# Patient Record
Sex: FEMALE | Race: WHITE | NOT HISPANIC OR LATINO | Employment: OTHER | URBAN - METROPOLITAN AREA
[De-identification: names, ages, dates, MRNs, and addresses within clinical notes are randomized per-mention and may not be internally consistent; named-entity substitution may affect disease eponyms.]

---

## 2018-06-14 ENCOUNTER — HISTORICAL (OUTPATIENT)
Dept: ADMINISTRATIVE | Facility: HOSPITAL | Age: 63
End: 2018-06-14

## 2018-06-14 LAB
ABS NEUT (OLG): 3.16 X10(3)/MCL (ref 2.1–9.2)
ALBUMIN SERPL-MCNC: 4 GM/DL (ref 3.4–5)
ALBUMIN/GLOB SERPL: 1.1 {RATIO}
ALP SERPL-CCNC: 69 UNIT/L (ref 38–126)
ALT SERPL-CCNC: 28 UNIT/L (ref 12–78)
AST SERPL-CCNC: 18 UNIT/L (ref 15–37)
BASOPHILS # BLD AUTO: 0 X10(3)/MCL (ref 0–0.2)
BASOPHILS NFR BLD AUTO: 0 %
BILIRUB SERPL-MCNC: 0.5 MG/DL (ref 0.2–1)
BILIRUBIN DIRECT+TOT PNL SERPL-MCNC: 0.1 MG/DL (ref 0–0.2)
BILIRUBIN DIRECT+TOT PNL SERPL-MCNC: 0.4 MG/DL (ref 0–0.8)
BUN SERPL-MCNC: 15 MG/DL (ref 7–18)
CALCIUM SERPL-MCNC: 9.2 MG/DL (ref 8.5–10.1)
CHLORIDE SERPL-SCNC: 105 MMOL/L (ref 98–107)
CHOLEST SERPL-MCNC: 162 MG/DL (ref 0–200)
CHOLEST/HDLC SERPL: 4.9 {RATIO} (ref 0–4)
CO2 SERPL-SCNC: 26 MMOL/L (ref 21–32)
CREAT SERPL-MCNC: 0.85 MG/DL (ref 0.55–1.02)
EOSINOPHIL # BLD AUTO: 0.1 X10(3)/MCL (ref 0–0.9)
EOSINOPHIL NFR BLD AUTO: 2 %
ERYTHROCYTE [DISTWIDTH] IN BLOOD BY AUTOMATED COUNT: 12.9 % (ref 11.5–17)
GLOBULIN SER-MCNC: 3.7 GM/DL (ref 2.4–3.5)
GLUCOSE SERPL-MCNC: 102 MG/DL (ref 74–106)
HCT VFR BLD AUTO: 44.1 % (ref 37–47)
HDLC SERPL-MCNC: 33 MG/DL (ref 35–60)
HGB BLD-MCNC: 14.7 GM/DL (ref 12–16)
LDLC SERPL CALC-MCNC: 89 MG/DL (ref 0–129)
LYMPHOCYTES # BLD AUTO: 2.6 X10(3)/MCL (ref 0.6–4.6)
LYMPHOCYTES NFR BLD AUTO: 40 %
MCH RBC QN AUTO: 31.5 PG (ref 27–31)
MCHC RBC AUTO-ENTMCNC: 33.3 GM/DL (ref 33–36)
MCV RBC AUTO: 94.4 FL (ref 80–94)
MONOCYTES # BLD AUTO: 0.6 X10(3)/MCL (ref 0.1–1.3)
MONOCYTES NFR BLD AUTO: 9 %
NEUTROPHILS # BLD AUTO: 3.16 X10(3)/MCL (ref 2.1–9.2)
NEUTROPHILS NFR BLD AUTO: 49 %
PLATELET # BLD AUTO: 248 X10(3)/MCL (ref 130–400)
PMV BLD AUTO: 9.2 FL (ref 9.4–12.4)
POTASSIUM SERPL-SCNC: 4.4 MMOL/L (ref 3.5–5.1)
PROT SERPL-MCNC: 7.7 GM/DL (ref 6.4–8.2)
RBC # BLD AUTO: 4.67 X10(6)/MCL (ref 4.2–5.4)
SODIUM SERPL-SCNC: 141 MMOL/L (ref 136–145)
TRIGL SERPL-MCNC: 200 MG/DL (ref 30–150)
VLDLC SERPL CALC-MCNC: 40 MG/DL
WBC # SPEC AUTO: 6.4 X10(3)/MCL (ref 4.5–11.5)

## 2019-03-15 ENCOUNTER — HISTORICAL (OUTPATIENT)
Dept: ADMINISTRATIVE | Facility: HOSPITAL | Age: 64
End: 2019-03-15

## 2019-03-15 LAB
BUN SERPL-MCNC: 21 MG/DL (ref 7–18)
CREAT SERPL-MCNC: 1.06 MG/DL (ref 0.55–1.02)

## 2019-06-21 ENCOUNTER — HISTORICAL (OUTPATIENT)
Dept: RADIOLOGY | Facility: HOSPITAL | Age: 64
End: 2019-06-21

## 2019-07-02 ENCOUNTER — HISTORICAL (OUTPATIENT)
Dept: RADIOLOGY | Facility: HOSPITAL | Age: 64
End: 2019-07-02

## 2019-07-15 ENCOUNTER — HISTORICAL (OUTPATIENT)
Dept: ADMINISTRATIVE | Facility: HOSPITAL | Age: 64
End: 2019-07-15

## 2019-07-15 LAB
ABS NEUT (OLG): 3.18 X10(3)/MCL (ref 2.1–9.2)
ALBUMIN SERPL-MCNC: 3.8 GM/DL (ref 3.4–5)
ALBUMIN/GLOB SERPL: 1.2 RATIO (ref 1.1–2)
ALP SERPL-CCNC: 69 UNIT/L (ref 38–126)
ALT SERPL-CCNC: 34 UNIT/L (ref 12–78)
AST SERPL-CCNC: 18 UNIT/L (ref 15–37)
BASOPHILS # BLD AUTO: 0.1 X10(3)/MCL (ref 0–0.2)
BASOPHILS NFR BLD AUTO: 1 %
BILIRUB SERPL-MCNC: 0.4 MG/DL (ref 0.2–1)
BILIRUBIN DIRECT+TOT PNL SERPL-MCNC: 0.1 MG/DL (ref 0–0.5)
BILIRUBIN DIRECT+TOT PNL SERPL-MCNC: 0.3 MG/DL (ref 0–0.8)
BUN SERPL-MCNC: 24 MG/DL (ref 7–18)
CALCIUM SERPL-MCNC: 9 MG/DL (ref 8.5–10.1)
CHLORIDE SERPL-SCNC: 106 MMOL/L (ref 98–107)
CHOLEST SERPL-MCNC: 156 MG/DL (ref 0–200)
CHOLEST/HDLC SERPL: 5.6 {RATIO} (ref 0–4)
CO2 SERPL-SCNC: 28 MMOL/L (ref 21–32)
CREAT SERPL-MCNC: 0.99 MG/DL (ref 0.55–1.02)
EOSINOPHIL # BLD AUTO: 0.4 X10(3)/MCL (ref 0–0.9)
EOSINOPHIL NFR BLD AUTO: 5 %
ERYTHROCYTE [DISTWIDTH] IN BLOOD BY AUTOMATED COUNT: 12.8 % (ref 11.5–17)
GLOBULIN SER-MCNC: 3.3 GM/DL (ref 2.4–3.5)
GLUCOSE SERPL-MCNC: 101 MG/DL (ref 74–106)
HCT VFR BLD AUTO: 44.8 % (ref 37–47)
HDLC SERPL-MCNC: 28 MG/DL (ref 35–60)
HGB BLD-MCNC: 14.5 GM/DL (ref 12–16)
LDLC SERPL CALC-MCNC: 86 MG/DL (ref 0–129)
LYMPHOCYTES # BLD AUTO: 3.1 X10(3)/MCL (ref 0.6–4.6)
LYMPHOCYTES NFR BLD AUTO: 41 %
MCH RBC QN AUTO: 31.3 PG (ref 27–31)
MCHC RBC AUTO-ENTMCNC: 32.4 GM/DL (ref 33–36)
MCV RBC AUTO: 96.8 FL (ref 80–94)
MONOCYTES # BLD AUTO: 0.8 X10(3)/MCL (ref 0.1–1.3)
MONOCYTES NFR BLD AUTO: 11 %
NEUTROPHILS # BLD AUTO: 3.18 X10(3)/MCL (ref 2.1–9.2)
NEUTROPHILS NFR BLD AUTO: 42 %
PLATELET # BLD AUTO: 248 X10(3)/MCL (ref 130–400)
PMV BLD AUTO: 9.8 FL (ref 9.4–12.4)
POTASSIUM SERPL-SCNC: 4.4 MMOL/L (ref 3.5–5.1)
PROT SERPL-MCNC: 7.1 GM/DL (ref 6.4–8.2)
RBC # BLD AUTO: 4.63 X10(6)/MCL (ref 4.2–5.4)
SODIUM SERPL-SCNC: 140 MMOL/L (ref 136–145)
TRIGL SERPL-MCNC: 209 MG/DL (ref 30–150)
VLDLC SERPL CALC-MCNC: 42 MG/DL
WBC # SPEC AUTO: 7.6 X10(3)/MCL (ref 4.5–11.5)

## 2020-02-11 ENCOUNTER — HISTORICAL (OUTPATIENT)
Dept: ADMINISTRATIVE | Facility: HOSPITAL | Age: 65
End: 2020-02-11

## 2020-02-11 LAB
ABS NEUT (OLG): 4.25 X10(3)/MCL (ref 2.1–9.2)
ALBUMIN SERPL-MCNC: 3.6 GM/DL (ref 3.4–5)
ALBUMIN/GLOB SERPL: 1.1 RATIO (ref 1.1–2)
ALP SERPL-CCNC: 68 UNIT/L (ref 38–126)
ALT SERPL-CCNC: 51 UNIT/L (ref 12–78)
AST SERPL-CCNC: 22 UNIT/L (ref 15–37)
BASOPHILS # BLD AUTO: 0.1 X10(3)/MCL (ref 0–0.2)
BASOPHILS NFR BLD AUTO: 1 %
BILIRUB SERPL-MCNC: 0.3 MG/DL (ref 0.2–1)
BILIRUBIN DIRECT+TOT PNL SERPL-MCNC: 0.1 MG/DL (ref 0–0.5)
BILIRUBIN DIRECT+TOT PNL SERPL-MCNC: 0.2 MG/DL (ref 0–0.8)
BUN SERPL-MCNC: 19 MG/DL (ref 7–18)
CALCIUM SERPL-MCNC: 9 MG/DL (ref 8.5–10.1)
CHLORIDE SERPL-SCNC: 109 MMOL/L (ref 98–107)
CHOLEST SERPL-MCNC: 138 MG/DL (ref 0–200)
CHOLEST/HDLC SERPL: 4.6 {RATIO} (ref 0–4)
CO2 SERPL-SCNC: 27 MMOL/L (ref 21–32)
CREAT SERPL-MCNC: 0.86 MG/DL (ref 0.55–1.02)
EOSINOPHIL # BLD AUTO: 0.1 X10(3)/MCL (ref 0–0.9)
EOSINOPHIL NFR BLD AUTO: 1 %
ERYTHROCYTE [DISTWIDTH] IN BLOOD BY AUTOMATED COUNT: 12.8 % (ref 11.5–17)
GLOBULIN SER-MCNC: 3.4 GM/DL (ref 2.4–3.5)
GLUCOSE SERPL-MCNC: 110 MG/DL (ref 74–106)
HCT VFR BLD AUTO: 45.8 % (ref 37–47)
HDLC SERPL-MCNC: 30 MG/DL (ref 35–60)
HGB BLD-MCNC: 15 GM/DL (ref 12–16)
LDLC SERPL CALC-MCNC: 80 MG/DL (ref 0–129)
LYMPHOCYTES # BLD AUTO: 2.5 X10(3)/MCL (ref 0.6–4.6)
LYMPHOCYTES NFR BLD AUTO: 32 %
MCH RBC QN AUTO: 31.6 PG (ref 27–31)
MCHC RBC AUTO-ENTMCNC: 32.8 GM/DL (ref 33–36)
MCV RBC AUTO: 96.6 FL (ref 80–94)
MONOCYTES # BLD AUTO: 0.7 X10(3)/MCL (ref 0.1–1.3)
MONOCYTES NFR BLD AUTO: 10 %
NEUTROPHILS # BLD AUTO: 4.25 X10(3)/MCL (ref 2.1–9.2)
NEUTROPHILS NFR BLD AUTO: 55 %
PLATELET # BLD AUTO: 276 X10(3)/MCL (ref 130–400)
PMV BLD AUTO: 9.5 FL (ref 9.4–12.4)
POTASSIUM SERPL-SCNC: 4.5 MMOL/L (ref 3.5–5.1)
PROT SERPL-MCNC: 7 GM/DL (ref 6.4–8.2)
RBC # BLD AUTO: 4.74 X10(6)/MCL (ref 4.2–5.4)
SODIUM SERPL-SCNC: 141 MMOL/L (ref 136–145)
TRIGL SERPL-MCNC: 140 MG/DL (ref 30–150)
VLDLC SERPL CALC-MCNC: 28 MG/DL
WBC # SPEC AUTO: 7.7 X10(3)/MCL (ref 4.5–11.5)

## 2020-05-29 ENCOUNTER — HISTORICAL (OUTPATIENT)
Dept: ADMINISTRATIVE | Facility: HOSPITAL | Age: 65
End: 2020-05-29

## 2020-05-29 LAB
BUN SERPL-MCNC: 14.4 MG/DL (ref 9.8–20.1)
CREAT SERPL-MCNC: 1.08 MG/DL (ref 0.55–1.02)

## 2020-09-08 ENCOUNTER — HISTORICAL (OUTPATIENT)
Dept: ADMINISTRATIVE | Facility: HOSPITAL | Age: 65
End: 2020-09-08

## 2020-09-08 LAB
BUN SERPL-MCNC: 13.8 MG/DL (ref 9.8–20.1)
CREAT SERPL-MCNC: 0.85 MG/DL (ref 0.55–1.02)

## 2020-12-17 ENCOUNTER — HISTORICAL (OUTPATIENT)
Dept: RADIOLOGY | Facility: HOSPITAL | Age: 65
End: 2020-12-17

## 2021-02-12 ENCOUNTER — HISTORICAL (OUTPATIENT)
Dept: ADMINISTRATIVE | Facility: HOSPITAL | Age: 66
End: 2021-02-12

## 2021-02-12 LAB
ABS NEUT (OLG): 3.38 X10(3)/MCL (ref 2.1–9.2)
ALBUMIN SERPL-MCNC: 3.8 GM/DL (ref 3.4–4.8)
ALBUMIN/GLOB SERPL: 1.3 RATIO (ref 1.1–2)
ALP SERPL-CCNC: 69 UNIT/L (ref 40–150)
ALT SERPL-CCNC: 31 UNIT/L (ref 0–55)
AST SERPL-CCNC: 21 UNIT/L (ref 5–34)
BASOPHILS # BLD AUTO: 0.1 X10(3)/MCL (ref 0–0.2)
BASOPHILS NFR BLD AUTO: 1 %
BILIRUB SERPL-MCNC: 0.5 MG/DL
BILIRUBIN DIRECT+TOT PNL SERPL-MCNC: 0.2 MG/DL (ref 0–0.5)
BILIRUBIN DIRECT+TOT PNL SERPL-MCNC: 0.3 MG/DL (ref 0–0.8)
BUN SERPL-MCNC: 15.6 MG/DL (ref 9.8–20.1)
CALCIUM SERPL-MCNC: 9 MG/DL (ref 8.4–10.2)
CHLORIDE SERPL-SCNC: 111 MMOL/L (ref 98–107)
CHOLEST SERPL-MCNC: 147 MG/DL
CHOLEST/HDLC SERPL: 5 {RATIO} (ref 0–5)
CO2 SERPL-SCNC: 27 MMOL/L (ref 23–31)
CREAT SERPL-MCNC: 0.84 MG/DL (ref 0.55–1.02)
EOSINOPHIL # BLD AUTO: 0.3 X10(3)/MCL (ref 0–0.9)
EOSINOPHIL NFR BLD AUTO: 5 %
ERYTHROCYTE [DISTWIDTH] IN BLOOD BY AUTOMATED COUNT: 13.1 % (ref 11.5–17)
GLOBULIN SER-MCNC: 2.9 GM/DL (ref 2.4–3.5)
GLUCOSE SERPL-MCNC: 99 MG/DL (ref 82–115)
HCT VFR BLD AUTO: 43.8 % (ref 37–47)
HDLC SERPL-MCNC: 28 MG/DL (ref 35–60)
HGB BLD-MCNC: 14.2 GM/DL (ref 12–16)
LDLC SERPL CALC-MCNC: 97 MG/DL (ref 50–140)
LYMPHOCYTES # BLD AUTO: 2.5 X10(3)/MCL (ref 0.6–4.6)
LYMPHOCYTES NFR BLD AUTO: 36 %
MCH RBC QN AUTO: 31.7 PG (ref 27–31)
MCHC RBC AUTO-ENTMCNC: 32.4 GM/DL (ref 33–36)
MCV RBC AUTO: 97.8 FL (ref 80–94)
MONOCYTES # BLD AUTO: 0.6 X10(3)/MCL (ref 0.1–1.3)
MONOCYTES NFR BLD AUTO: 8 %
NEUTROPHILS # BLD AUTO: 3.38 X10(3)/MCL (ref 2.1–9.2)
NEUTROPHILS NFR BLD AUTO: 49 %
PLATELET # BLD AUTO: 240 X10(3)/MCL (ref 130–400)
PMV BLD AUTO: 9.8 FL (ref 9.4–12.4)
POTASSIUM SERPL-SCNC: 4.6 MMOL/L (ref 3.5–5.1)
PROT SERPL-MCNC: 6.7 GM/DL (ref 5.8–7.6)
RBC # BLD AUTO: 4.48 X10(6)/MCL (ref 4.2–5.4)
SODIUM SERPL-SCNC: 143 MMOL/L (ref 136–145)
TRIGL SERPL-MCNC: 109 MG/DL (ref 37–140)
VLDLC SERPL CALC-MCNC: 22 MG/DL
WBC # SPEC AUTO: 6.9 X10(3)/MCL (ref 4.5–11.5)

## 2021-04-14 ENCOUNTER — HISTORICAL (OUTPATIENT)
Dept: RADIOLOGY | Facility: HOSPITAL | Age: 66
End: 2021-04-14

## 2021-04-14 LAB — POC CREATININE: 1 MG/DL (ref 0.6–1.3)

## 2021-06-25 ENCOUNTER — HISTORICAL (OUTPATIENT)
Dept: ADMINISTRATIVE | Facility: HOSPITAL | Age: 66
End: 2021-06-25

## 2021-06-25 LAB
BUN SERPL-MCNC: 16.1 MG/DL (ref 9.8–20.1)
CREAT SERPL-MCNC: 0.81 MG/DL (ref 0.55–1.02)

## 2021-07-27 LAB
ABS NEUT (OLG): 2.78 X10(3)/MCL (ref 2.1–9.2)
BASOPHILS # BLD AUTO: 0.1 X10(3)/MCL (ref 0–0.2)
BASOPHILS NFR BLD AUTO: 1 %
BUN SERPL-MCNC: 15.2 MG/DL (ref 9.8–20.1)
CALCIUM SERPL-MCNC: 9.4 MG/DL (ref 8.4–10.2)
CHLORIDE SERPL-SCNC: 105 MMOL/L (ref 98–107)
CO2 SERPL-SCNC: 27 MMOL/L (ref 23–31)
CREAT SERPL-MCNC: 0.89 MG/DL (ref 0.55–1.02)
CREAT/UREA NIT SERPL: 17
EOSINOPHIL # BLD AUTO: 0.1 X10(3)/MCL (ref 0–0.9)
EOSINOPHIL NFR BLD AUTO: 2 %
ERYTHROCYTE [DISTWIDTH] IN BLOOD BY AUTOMATED COUNT: 13.1 % (ref 11.5–17)
GLUCOSE SERPL-MCNC: 94 MG/DL (ref 82–115)
HCT VFR BLD AUTO: 42.8 % (ref 37–47)
HGB BLD-MCNC: 13.9 GM/DL (ref 12–16)
LYMPHOCYTES # BLD AUTO: 2.4 X10(3)/MCL (ref 0.6–4.6)
LYMPHOCYTES NFR BLD AUTO: 39 %
MCH RBC QN AUTO: 31.2 PG (ref 27–31)
MCHC RBC AUTO-ENTMCNC: 32.5 GM/DL (ref 33–36)
MCV RBC AUTO: 96.2 FL (ref 80–94)
MONOCYTES # BLD AUTO: 0.7 X10(3)/MCL (ref 0.1–1.3)
MONOCYTES NFR BLD AUTO: 11 %
NEUTROPHILS # BLD AUTO: 2.78 X10(3)/MCL (ref 2.1–9.2)
NEUTROPHILS NFR BLD AUTO: 46 %
PLATELET # BLD AUTO: 252 X10(3)/MCL (ref 130–400)
PMV BLD AUTO: 9.9 FL (ref 9.4–12.4)
POTASSIUM SERPL-SCNC: 5.1 MMOL/L (ref 3.5–5.1)
RBC # BLD AUTO: 4.45 X10(6)/MCL (ref 4.2–5.4)
SODIUM SERPL-SCNC: 140 MMOL/L (ref 136–145)
WBC # SPEC AUTO: 6 X10(3)/MCL (ref 4.5–11.5)

## 2021-09-15 ENCOUNTER — HISTORICAL (OUTPATIENT)
Dept: ADMINISTRATIVE | Facility: HOSPITAL | Age: 66
End: 2021-09-15

## 2021-09-15 LAB
ABS NEUT (OLG): 2.96 X10(3)/MCL (ref 2.1–9.2)
BASOPHILS # BLD AUTO: 0 X10(3)/MCL (ref 0–0.2)
BASOPHILS NFR BLD AUTO: 1 %
BUN SERPL-MCNC: 14.9 MG/DL (ref 9.8–20.1)
CALCIUM SERPL-MCNC: 9.7 MG/DL (ref 8.4–10.2)
CHLORIDE SERPL-SCNC: 105 MMOL/L (ref 98–107)
CO2 SERPL-SCNC: 27 MMOL/L (ref 23–31)
CREAT SERPL-MCNC: 0.84 MG/DL (ref 0.55–1.02)
CREAT/UREA NIT SERPL: 18
EOSINOPHIL # BLD AUTO: 0.1 X10(3)/MCL (ref 0–0.9)
EOSINOPHIL NFR BLD AUTO: 2 %
ERYTHROCYTE [DISTWIDTH] IN BLOOD BY AUTOMATED COUNT: 13 % (ref 11.5–17)
GLUCOSE SERPL-MCNC: 94 MG/DL (ref 82–115)
HCT VFR BLD AUTO: 45.8 % (ref 37–47)
HGB BLD-MCNC: 14.9 GM/DL (ref 12–16)
LYMPHOCYTES # BLD AUTO: 2.6 X10(3)/MCL (ref 0.6–4.6)
LYMPHOCYTES NFR BLD AUTO: 40 %
MCH RBC QN AUTO: 31 PG (ref 27–31)
MCHC RBC AUTO-ENTMCNC: 32.5 GM/DL (ref 33–36)
MCV RBC AUTO: 95.2 FL (ref 80–94)
MONOCYTES # BLD AUTO: 0.6 X10(3)/MCL (ref 0.1–1.3)
MONOCYTES NFR BLD AUTO: 10 %
NEUTROPHILS # BLD AUTO: 2.96 X10(3)/MCL (ref 2.1–9.2)
NEUTROPHILS NFR BLD AUTO: 47 %
PLATELET # BLD AUTO: 265 X10(3)/MCL (ref 130–400)
PMV BLD AUTO: 9.8 FL (ref 9.4–12.4)
POTASSIUM SERPL-SCNC: 4.4 MMOL/L (ref 3.5–5.1)
RBC # BLD AUTO: 4.81 X10(6)/MCL (ref 4.2–5.4)
SARS-COV-2 RNA RESP QL NAA+PROBE: NOT DETECTED
SODIUM SERPL-SCNC: 139 MMOL/L (ref 136–145)
WBC # SPEC AUTO: 6.4 X10(3)/MCL (ref 4.5–11.5)

## 2021-09-17 ENCOUNTER — HOSPITAL ENCOUNTER (OUTPATIENT)
Dept: INTENSIVE CARE | Facility: HOSPITAL | Age: 66
End: 2021-09-19

## 2021-09-17 LAB — GROUP & RH: NORMAL

## 2021-10-25 ENCOUNTER — HISTORICAL (OUTPATIENT)
Dept: ADMINISTRATIVE | Facility: HOSPITAL | Age: 66
End: 2021-10-25

## 2022-01-06 ENCOUNTER — HISTORICAL (OUTPATIENT)
Dept: ADMINISTRATIVE | Facility: HOSPITAL | Age: 67
End: 2022-01-06

## 2022-05-04 NOTE — HISTORICAL [facility] CERNER
This is a historical note converted from Harsha. Formatting and pictures may have been removed.  Please reference Harsha for original formatting and attached multimedia. Indication for Surgery  The patient is a?66-year-old female?who presented for evaluation of both neck pain with radicular symptoms as well as?low back problems.? Guarding her low back she required?bilateral L3/L4 and L4/L5 decompressions?and now wishes to have her neck addressed.? Pain is currently?in the neck?radiates down the arms?she rates it as?7 out of 10.? She also reproduces her neck pain?with range of motion.? Pain in her arms runs down to her hands and to her thumbs.? Imaging study showed significant cervical spondylosis?C3/4 through C6/7 and the recommendation at this time?is an anterior cervical discectomy and fusion C3-C7?patient understands the risk and benefits and consents  Preoperative Diagnosis  Cervical spondylosis with radiculopathy  Postoperative Diagnosis  Same  Operation  Anterior cervical discectomy C3/4, C4/5, C5/6, C6/7  Anterior arthrodesis with?allograft?C3/4, C4/5, C5/6, C6/7  Anterior cervical instrumentation with Orthofix plate C3-C7  Use of the microscope  Surgeon(s)  Emil Temple MD  Assistant  None  Anesthesia  General  Estimated Blood Loss  100 mL  Findings  Cervical spondylosis  Specimen(s)  None  Complications  None  Technique  The patient was taken to the operating room and placed on the table in supine position. ?After instillation of general anesthetic,?patient was positioned and prepped and draped usual fashion.? Transverse incision was made over C5. ?Hemostasis was assured with electrocautery.? Dissection was carried down to the platysmas and the medial border of the sternocleidomastoid identified.? Then utilizing sharp and blunt dissection, the dissection was carried down to the prevertebral space.? Longus colli muscle was divided divided midline. ?Self-retaining was positioned.? Starting at the upper  2 levels distraction screws were placed at C3?and C5. ?Air was slight distracted?utilizing air drill anterior osteophytes had to be taken out first?and then discectomy was performed with the [place] drill. ?Microscope was brought in and posterior osteophytes were taken off with a 2 mm Kerrison?vertebral bodies were undermined and the spinal cord was decompressed extensive bilateral foraminotomies were performed.? Wound was irrigated after assuring hemostasis Gelfoam soaked in thrombin?allograft were selected and impacted at?C3/4 and at C4/5.? Distraction was released and distraction screw at C3 was moved to C7 and the hole was bone wax. ?Again the area was slight distracted anterior osteophytes had to be drilled down?with a Vick drill?and then discectomy was performed with the dura. ?Microscope was brought back in and a 2 mm Kerrison was used to remove posterior osteophytes?decompress the cord and extensive bilateral foraminotomies were performed.? Wound was irrigated and again after assuring hemostasis with Gelfoam soaked in thrombin allograft were selected and impacted at C5/6 and at C6/7.? Distraction was removed distraction screws?were removed and the holes?were bone wax.? A Orthofix plate spanning from C3-C7 was position then under fluoroscopic control and utilizing the drill guide drill holes were placed.? Screws were inserted. ?Locking mechanisms were engaged.? Wound was irrigated?and assuring hemostasis?a 10 Nauruan Abimael-Neal drain was inserted self retainers were removed and the platysmas was reapproximated with 2-0 Vicryl running fashion?skin was closed with 4-0 Vicryl subcuticular stitch. ?Dressed with Steri-Strips and island dressing.? Patient tolerated procedure well and was transferred to recovery in stable condition. ?Of note bone quality was good and she does not require a hard collar.

## 2023-06-08 ENCOUNTER — OUTSIDE PLACE OF SERVICE (OUTPATIENT)
Dept: SURGERY | Facility: CLINIC | Age: 68
End: 2023-06-08

## 2023-06-21 DIAGNOSIS — M48.062 NEUROGENIC CLAUDICATION DUE TO LUMBAR SPINAL STENOSIS: Primary | ICD-10-CM

## 2023-06-26 ENCOUNTER — HOSPITAL ENCOUNTER (OUTPATIENT)
Dept: RADIOLOGY | Facility: HOSPITAL | Age: 68
Discharge: HOME | End: 2023-06-26
Attending: NEUROLOGICAL SURGERY
Payer: MEDICARE

## 2023-06-26 DIAGNOSIS — M48.062 PSEUDOCLAUDICATION SYNDROME: Primary | ICD-10-CM

## 2023-06-26 DIAGNOSIS — M48.062 PSEUDOCLAUDICATION SYNDROME: ICD-10-CM

## 2023-06-26 PROCEDURE — 72114 X-RAY EXAM L-S SPINE BENDING: CPT | Mod: TC

## 2023-07-19 RX ORDER — HYDROCHLOROTHIAZIDE 12.5 MG/1
12.5 TABLET ORAL DAILY
COMMUNITY
Start: 2023-05-08

## 2023-07-19 RX ORDER — LISINOPRIL 20 MG/1
20 TABLET ORAL DAILY
COMMUNITY
Start: 2023-05-08

## 2023-07-19 RX ORDER — ATORVASTATIN CALCIUM 20 MG/1
TABLET, FILM COATED ORAL
COMMUNITY
Start: 2023-06-19

## 2023-07-19 RX ORDER — ESCITALOPRAM OXALATE 20 MG/1
20 TABLET ORAL DAILY
COMMUNITY

## 2023-07-19 RX ORDER — QUETIAPINE FUMARATE 200 MG/1
200 TABLET, FILM COATED ORAL NIGHTLY
COMMUNITY
Start: 2023-05-08

## 2023-07-19 RX ORDER — CHOLECALCIFEROL (VITAMIN D3) 50 MCG
1 TABLET ORAL DAILY
COMMUNITY

## 2023-07-19 RX ORDER — METOPROLOL TARTRATE 25 MG/1
25 TABLET, FILM COATED ORAL 2 TIMES DAILY
COMMUNITY
Start: 2023-05-08

## 2023-07-24 DIAGNOSIS — M48.062 PSEUDOCLAUDICATION SYNDROME: Primary | ICD-10-CM

## 2023-07-26 ENCOUNTER — HOSPITAL ENCOUNTER (OUTPATIENT)
Dept: RADIOLOGY | Facility: HOSPITAL | Age: 68
Discharge: HOME | End: 2023-07-26
Attending: NEUROLOGICAL SURGERY
Payer: MEDICARE

## 2023-07-26 PROCEDURE — 71046 X-RAY EXAM CHEST 2 VIEWS: CPT | Mod: TC

## 2023-07-27 ENCOUNTER — ANESTHESIA EVENT (OUTPATIENT)
Dept: SURGERY | Facility: HOSPITAL | Age: 68
DRG: 460 | End: 2023-07-27
Payer: MEDICARE

## 2023-07-27 DIAGNOSIS — I70.0 ATHEROSCLEROSIS OF AORTA: Primary | ICD-10-CM

## 2023-07-28 ENCOUNTER — HOSPITAL ENCOUNTER (OUTPATIENT)
Dept: RADIOLOGY | Facility: HOSPITAL | Age: 68
Discharge: HOME | End: 2023-07-28
Attending: NEUROLOGICAL SURGERY
Payer: MEDICARE

## 2023-07-28 DIAGNOSIS — M48.062 PSEUDOCLAUDICATION SYNDROME: ICD-10-CM

## 2023-07-28 PROCEDURE — 72131 CT LUMBAR SPINE W/O DYE: CPT | Mod: TC

## 2023-08-01 ENCOUNTER — PATIENT MESSAGE (OUTPATIENT)
Dept: ADMINISTRATIVE | Facility: OTHER | Age: 68
End: 2023-08-01
Payer: MEDICARE

## 2023-08-02 ENCOUNTER — ANESTHESIA (OUTPATIENT)
Dept: SURGERY | Facility: HOSPITAL | Age: 68
DRG: 460 | End: 2023-08-02
Payer: MEDICARE

## 2023-08-02 ENCOUNTER — HOSPITAL ENCOUNTER (INPATIENT)
Facility: HOSPITAL | Age: 68
LOS: 3 days | Discharge: HOME | DRG: 460 | End: 2023-08-05
Attending: NEUROLOGICAL SURGERY | Admitting: NEUROLOGICAL SURGERY
Payer: MEDICARE

## 2023-08-02 DIAGNOSIS — M48.062 SPINAL STENOSIS, LUMBAR REGION, WITH NEUROGENIC CLAUDICATION: ICD-10-CM

## 2023-08-02 LAB
BASOPHILS # BLD AUTO: 0.07 X10(3)/MCL
BASOPHILS NFR BLD AUTO: 1.2 %
EOSINOPHIL # BLD AUTO: 0.14 X10(3)/MCL (ref 0–0.9)
EOSINOPHIL NFR BLD AUTO: 2.3 %
ERYTHROCYTE [DISTWIDTH] IN BLOOD BY AUTOMATED COUNT: 12.6 % (ref 11.5–17)
GROUP & RH: NORMAL
HCT VFR BLD AUTO: 41.2 % (ref 37–47)
HGB BLD-MCNC: 14.1 G/DL (ref 12–16)
IMM GRANULOCYTES # BLD AUTO: 0.02 X10(3)/MCL (ref 0–0.04)
IMM GRANULOCYTES NFR BLD AUTO: 0.3 %
INDIRECT COOMBS GEL: NORMAL
LYMPHOCYTES # BLD AUTO: 2.41 X10(3)/MCL (ref 0.6–4.6)
LYMPHOCYTES NFR BLD AUTO: 40 %
MCH RBC QN AUTO: 32.3 PG (ref 27–31)
MCHC RBC AUTO-ENTMCNC: 34.2 G/DL (ref 33–36)
MCV RBC AUTO: 94.3 FL (ref 80–94)
MONOCYTES # BLD AUTO: 0.64 X10(3)/MCL (ref 0.1–1.3)
MONOCYTES NFR BLD AUTO: 10.6 %
NEUTROPHILS # BLD AUTO: 2.74 X10(3)/MCL (ref 2.1–9.2)
NEUTROPHILS NFR BLD AUTO: 45.6 %
NRBC BLD AUTO-RTO: 0 %
PLATELET # BLD AUTO: 221 X10(3)/MCL (ref 130–400)
PMV BLD AUTO: 9.5 FL (ref 7.4–10.4)
RBC # BLD AUTO: 4.37 X10(6)/MCL (ref 4.2–5.4)
SPECIMEN OUTDATE: NORMAL
WBC # SPEC AUTO: 6.02 X10(3)/MCL (ref 4.5–11.5)

## 2023-08-02 PROCEDURE — 71000033 HC RECOVERY, INTIAL HOUR: Performed by: NEUROLOGICAL SURGERY

## 2023-08-02 PROCEDURE — 36620 INSERTION CATHETER ARTERY: CPT | Mod: 59,,, | Performed by: ANESTHESIOLOGY

## 2023-08-02 PROCEDURE — 63600175 PHARM REV CODE 636 W HCPCS: Performed by: NURSE ANESTHETIST, CERTIFIED REGISTERED

## 2023-08-02 PROCEDURE — D9220A PRA ANESTHESIA: Mod: CRNA,,, | Performed by: NURSE ANESTHETIST, CERTIFIED REGISTERED

## 2023-08-02 PROCEDURE — D9220A PRA ANESTHESIA: ICD-10-PCS | Mod: ANES,,, | Performed by: ANESTHESIOLOGY

## 2023-08-02 PROCEDURE — 25000003 PHARM REV CODE 250: Performed by: NURSE ANESTHETIST, CERTIFIED REGISTERED

## 2023-08-02 PROCEDURE — 37000008 HC ANESTHESIA 1ST 15 MINUTES: Performed by: NEUROLOGICAL SURGERY

## 2023-08-02 PROCEDURE — 51702 INSERT TEMP BLADDER CATH: CPT

## 2023-08-02 PROCEDURE — 36620 ARTERIAL: ICD-10-PCS | Mod: 59,,, | Performed by: ANESTHESIOLOGY

## 2023-08-02 PROCEDURE — 25000003 PHARM REV CODE 250: Performed by: ANESTHESIOLOGY

## 2023-08-02 PROCEDURE — D9220A PRA ANESTHESIA: ICD-10-PCS | Mod: CRNA,,, | Performed by: NURSE ANESTHETIST, CERTIFIED REGISTERED

## 2023-08-02 PROCEDURE — 63600175 PHARM REV CODE 636 W HCPCS: Mod: JZ,JG

## 2023-08-02 PROCEDURE — 27201423 OPTIME MED/SURG SUP & DEVICES STERILE SUPPLY: Performed by: NEUROLOGICAL SURGERY

## 2023-08-02 PROCEDURE — 86900 BLOOD TYPING SEROLOGIC ABO: CPT

## 2023-08-02 PROCEDURE — D9220A PRA ANESTHESIA: Mod: ANES,,, | Performed by: ANESTHESIOLOGY

## 2023-08-02 PROCEDURE — 63600175 PHARM REV CODE 636 W HCPCS: Performed by: ANESTHESIOLOGY

## 2023-08-02 PROCEDURE — C1713 ANCHOR/SCREW BN/BN,TIS/BN: HCPCS | Performed by: NEUROLOGICAL SURGERY

## 2023-08-02 PROCEDURE — 25000003 PHARM REV CODE 250: Performed by: NEUROLOGICAL SURGERY

## 2023-08-02 PROCEDURE — 71000015 HC POSTOP RECOV 1ST HR: Performed by: NEUROLOGICAL SURGERY

## 2023-08-02 PROCEDURE — 63600175 PHARM REV CODE 636 W HCPCS

## 2023-08-02 PROCEDURE — P9047 ALBUMIN (HUMAN), 25%, 50ML: HCPCS | Mod: JZ,JG

## 2023-08-02 PROCEDURE — 63600175 PHARM REV CODE 636 W HCPCS: Performed by: NEUROLOGICAL SURGERY

## 2023-08-02 PROCEDURE — 27800903 OPTIME MED/SURG SUP & DEVICES OTHER IMPLANTS: Performed by: NEUROLOGICAL SURGERY

## 2023-08-02 PROCEDURE — 85025 COMPLETE CBC W/AUTO DIFF WBC: CPT

## 2023-08-02 PROCEDURE — 37000009 HC ANESTHESIA EA ADD 15 MINS: Performed by: NEUROLOGICAL SURGERY

## 2023-08-02 PROCEDURE — 25000003 PHARM REV CODE 250

## 2023-08-02 PROCEDURE — 36000712 HC OR TIME LEV V 1ST 15 MIN: Performed by: NEUROLOGICAL SURGERY

## 2023-08-02 PROCEDURE — 36000713 HC OR TIME LEV V EA ADD 15 MIN: Performed by: NEUROLOGICAL SURGERY

## 2023-08-02 PROCEDURE — 71000039 HC RECOVERY, EACH ADD'L HOUR: Performed by: NEUROLOGICAL SURGERY

## 2023-08-02 PROCEDURE — 11000001 HC ACUTE MED/SURG PRIVATE ROOM

## 2023-08-02 DEVICE — SET SCREW: Type: IMPLANTABLE DEVICE | Site: SPINE LUMBAR | Status: FUNCTIONAL

## 2023-08-02 DEVICE — IMPLANTABLE DEVICE: Type: IMPLANTABLE DEVICE | Site: SPINE LUMBAR | Status: FUNCTIONAL

## 2023-08-02 DEVICE — DBM T42210 2.5CMX5CM 2 EACH GRAFTON MATR
Type: IMPLANTABLE DEVICE | Site: SPINE LUMBAR | Status: FUNCTIONAL
Brand: GRAFTON®AND GRAFTON PLUS®DEMINERALIZED BONE MATRIX (DBM)

## 2023-08-02 RX ORDER — ACETAMINOPHEN 325 MG/1
650 TABLET ORAL EVERY 6 HOURS PRN
Status: DISCONTINUED | OUTPATIENT
Start: 2023-08-02 | End: 2023-08-05 | Stop reason: HOSPADM

## 2023-08-02 RX ORDER — HYDROCODONE BITARTRATE AND ACETAMINOPHEN 10; 325 MG/1; MG/1
1 TABLET ORAL EVERY 4 HOURS PRN
Status: DISCONTINUED | OUTPATIENT
Start: 2023-08-02 | End: 2023-08-05 | Stop reason: HOSPADM

## 2023-08-02 RX ORDER — GLYCOPYRROLATE 0.2 MG/ML
INJECTION INTRAMUSCULAR; INTRAVENOUS
Status: DISCONTINUED | OUTPATIENT
Start: 2023-08-02 | End: 2023-08-02

## 2023-08-02 RX ORDER — CYCLOBENZAPRINE HCL 10 MG
10 TABLET ORAL 3 TIMES DAILY PRN
Status: DISCONTINUED | OUTPATIENT
Start: 2023-08-02 | End: 2023-08-05 | Stop reason: HOSPADM

## 2023-08-02 RX ORDER — ONDANSETRON 4 MG/1
4 TABLET, ORALLY DISINTEGRATING ORAL EVERY 6 HOURS PRN
Status: DISCONTINUED | OUTPATIENT
Start: 2023-08-02 | End: 2023-08-05 | Stop reason: HOSPADM

## 2023-08-02 RX ORDER — PROCHLORPERAZINE EDISYLATE 5 MG/ML
10 INJECTION INTRAMUSCULAR; INTRAVENOUS EVERY 6 HOURS PRN
Status: DISCONTINUED | OUTPATIENT
Start: 2023-08-02 | End: 2023-08-05 | Stop reason: HOSPADM

## 2023-08-02 RX ORDER — CALCIUM CARBONATE 200(500)MG
500 TABLET,CHEWABLE ORAL DAILY PRN
Status: DISCONTINUED | OUTPATIENT
Start: 2023-08-02 | End: 2023-08-05 | Stop reason: HOSPADM

## 2023-08-02 RX ORDER — MORPHINE SULFATE 4 MG/ML
2 INJECTION, SOLUTION INTRAMUSCULAR; INTRAVENOUS
Status: DISCONTINUED | OUTPATIENT
Start: 2023-08-02 | End: 2023-08-05 | Stop reason: HOSPADM

## 2023-08-02 RX ORDER — MUPIROCIN 20 MG/G
OINTMENT TOPICAL 2 TIMES DAILY
Status: DISCONTINUED | OUTPATIENT
Start: 2023-08-02 | End: 2023-08-05 | Stop reason: HOSPADM

## 2023-08-02 RX ORDER — LIDOCAINE HYDROCHLORIDE 20 MG/ML
INJECTION, SOLUTION EPIDURAL; INFILTRATION; INTRACAUDAL; PERINEURAL
Status: DISCONTINUED | OUTPATIENT
Start: 2023-08-02 | End: 2023-08-02

## 2023-08-02 RX ORDER — CEFAZOLIN SODIUM 1 G/3ML
INJECTION, POWDER, FOR SOLUTION INTRAMUSCULAR; INTRAVENOUS
Status: DISCONTINUED | OUTPATIENT
Start: 2023-08-02 | End: 2023-08-02 | Stop reason: HOSPADM

## 2023-08-02 RX ORDER — ONDANSETRON 2 MG/ML
INJECTION INTRAMUSCULAR; INTRAVENOUS
Status: DISCONTINUED | OUTPATIENT
Start: 2023-08-02 | End: 2023-08-02

## 2023-08-02 RX ORDER — HYDROMORPHONE HYDROCHLORIDE 2 MG/ML
INJECTION, SOLUTION INTRAMUSCULAR; INTRAVENOUS; SUBCUTANEOUS
Status: DISCONTINUED | OUTPATIENT
Start: 2023-08-02 | End: 2023-08-02

## 2023-08-02 RX ORDER — CEFAZOLIN SODIUM 2 G/50ML
2 SOLUTION INTRAVENOUS
Status: DISPENSED | OUTPATIENT
Start: 2023-08-02 | End: 2023-08-03

## 2023-08-02 RX ORDER — METOPROLOL TARTRATE 25 MG/1
25 TABLET, FILM COATED ORAL 2 TIMES DAILY
Status: DISCONTINUED | OUTPATIENT
Start: 2023-08-02 | End: 2023-08-05 | Stop reason: HOSPADM

## 2023-08-02 RX ORDER — ONDANSETRON 2 MG/ML
4 INJECTION INTRAMUSCULAR; INTRAVENOUS ONCE AS NEEDED
Status: DISCONTINUED | OUTPATIENT
Start: 2023-08-02 | End: 2023-08-02 | Stop reason: HOSPADM

## 2023-08-02 RX ORDER — ACETAMINOPHEN 10 MG/ML
INJECTION, SOLUTION INTRAVENOUS
Status: DISCONTINUED | OUTPATIENT
Start: 2023-08-02 | End: 2023-08-02

## 2023-08-02 RX ORDER — MORPHINE SULFATE 4 MG/ML
1 INJECTION, SOLUTION INTRAMUSCULAR; INTRAVENOUS
Status: DISCONTINUED | OUTPATIENT
Start: 2023-08-02 | End: 2023-08-05 | Stop reason: HOSPADM

## 2023-08-02 RX ORDER — SODIUM CHLORIDE 9 MG/ML
INJECTION, SOLUTION INTRAVENOUS CONTINUOUS
Status: DISCONTINUED | OUTPATIENT
Start: 2023-08-02 | End: 2023-08-05 | Stop reason: HOSPADM

## 2023-08-02 RX ORDER — ROCURONIUM BROMIDE 10 MG/ML
INJECTION, SOLUTION INTRAVENOUS
Status: DISCONTINUED | OUTPATIENT
Start: 2023-08-02 | End: 2023-08-02

## 2023-08-02 RX ORDER — PROPOFOL 10 MG/ML
VIAL (ML) INTRAVENOUS
Status: DISCONTINUED | OUTPATIENT
Start: 2023-08-02 | End: 2023-08-02

## 2023-08-02 RX ORDER — MAG HYDROX/ALUMINUM HYD/SIMETH 200-200-20
30 SUSPENSION, ORAL (FINAL DOSE FORM) ORAL EVERY 4 HOURS PRN
Status: DISCONTINUED | OUTPATIENT
Start: 2023-08-02 | End: 2023-08-05 | Stop reason: HOSPADM

## 2023-08-02 RX ORDER — MIDAZOLAM HYDROCHLORIDE 1 MG/ML
INJECTION INTRAMUSCULAR; INTRAVENOUS
Status: DISPENSED
Start: 2023-08-02 | End: 2023-08-02

## 2023-08-02 RX ORDER — HYDROCODONE BITARTRATE AND ACETAMINOPHEN 5; 325 MG/1; MG/1
1 TABLET ORAL EVERY 4 HOURS PRN
Status: DISCONTINUED | OUTPATIENT
Start: 2023-08-02 | End: 2023-08-05 | Stop reason: HOSPADM

## 2023-08-02 RX ORDER — DIPHENHYDRAMINE HYDROCHLORIDE 50 MG/ML
25 INJECTION INTRAMUSCULAR; INTRAVENOUS ONCE AS NEEDED
Status: DISCONTINUED | OUTPATIENT
Start: 2023-08-02 | End: 2023-08-02 | Stop reason: HOSPADM

## 2023-08-02 RX ORDER — LIDOCAINE HYDROCHLORIDE AND EPINEPHRINE 5; 5 MG/ML; UG/ML
INJECTION, SOLUTION INFILTRATION; PERINEURAL
Status: DISCONTINUED | OUTPATIENT
Start: 2023-08-02 | End: 2023-08-02 | Stop reason: HOSPADM

## 2023-08-02 RX ORDER — CEFAZOLIN SODIUM 2 G/50ML
2 SOLUTION INTRAVENOUS
Status: DISCONTINUED | OUTPATIENT
Start: 2023-08-02 | End: 2023-08-05 | Stop reason: HOSPADM

## 2023-08-02 RX ORDER — LISINOPRIL 20 MG/1
20 TABLET ORAL DAILY
Status: DISCONTINUED | OUTPATIENT
Start: 2023-08-02 | End: 2023-08-04

## 2023-08-02 RX ORDER — ALBUMIN HUMAN 250 G/1000ML
25 SOLUTION INTRAVENOUS ONCE
Status: COMPLETED | OUTPATIENT
Start: 2023-08-02 | End: 2023-08-02

## 2023-08-02 RX ORDER — MEPERIDINE HYDROCHLORIDE 25 MG/ML
12.5 INJECTION INTRAMUSCULAR; INTRAVENOUS; SUBCUTANEOUS EVERY 10 MIN PRN
Status: DISCONTINUED | OUTPATIENT
Start: 2023-08-02 | End: 2023-08-02 | Stop reason: HOSPADM

## 2023-08-02 RX ORDER — PHENYLEPHRINE HCL IN 0.9% NACL 1 MG/10 ML
SYRINGE (ML) INTRAVENOUS
Status: DISCONTINUED | OUTPATIENT
Start: 2023-08-02 | End: 2023-08-02

## 2023-08-02 RX ORDER — MIDAZOLAM HYDROCHLORIDE 1 MG/ML
2 INJECTION INTRAMUSCULAR; INTRAVENOUS ONCE
Status: COMPLETED | OUTPATIENT
Start: 2023-08-02 | End: 2023-08-02

## 2023-08-02 RX ORDER — BISACODYL 10 MG
10 SUPPOSITORY, RECTAL RECTAL DAILY
Status: DISCONTINUED | OUTPATIENT
Start: 2023-08-02 | End: 2023-08-05 | Stop reason: HOSPADM

## 2023-08-02 RX ORDER — ATORVASTATIN CALCIUM 10 MG/1
20 TABLET, FILM COATED ORAL DAILY
Status: DISCONTINUED | OUTPATIENT
Start: 2023-08-02 | End: 2023-08-05 | Stop reason: HOSPADM

## 2023-08-02 RX ORDER — HYDROMORPHONE HYDROCHLORIDE 2 MG/ML
0.5 INJECTION, SOLUTION INTRAMUSCULAR; INTRAVENOUS; SUBCUTANEOUS EVERY 5 MIN PRN
Status: DISCONTINUED | OUTPATIENT
Start: 2023-08-02 | End: 2023-08-02 | Stop reason: HOSPADM

## 2023-08-02 RX ORDER — SCOLOPAMINE TRANSDERMAL SYSTEM 1 MG/1
1 PATCH, EXTENDED RELEASE TRANSDERMAL
Status: DISCONTINUED | OUTPATIENT
Start: 2023-08-02 | End: 2023-08-05 | Stop reason: HOSPADM

## 2023-08-02 RX ORDER — HYDROCODONE BITARTRATE AND ACETAMINOPHEN 7.5; 325 MG/1; MG/1
2 TABLET ORAL EVERY 4 HOURS PRN
Status: DISCONTINUED | OUTPATIENT
Start: 2023-08-02 | End: 2023-08-05 | Stop reason: HOSPADM

## 2023-08-02 RX ORDER — ENOXAPARIN SODIUM 100 MG/ML
40 INJECTION SUBCUTANEOUS EVERY 24 HOURS
Status: DISCONTINUED | OUTPATIENT
Start: 2023-08-04 | End: 2023-08-05 | Stop reason: HOSPADM

## 2023-08-02 RX ORDER — SCOLOPAMINE TRANSDERMAL SYSTEM 1 MG/1
PATCH, EXTENDED RELEASE TRANSDERMAL
Status: DISPENSED
Start: 2023-08-02 | End: 2023-08-02

## 2023-08-02 RX ORDER — ESCITALOPRAM OXALATE 10 MG/1
20 TABLET ORAL DAILY
Status: DISCONTINUED | OUTPATIENT
Start: 2023-08-02 | End: 2023-08-05 | Stop reason: HOSPADM

## 2023-08-02 RX ORDER — ADHESIVE BANDAGE
30 BANDAGE TOPICAL DAILY PRN
Status: DISCONTINUED | OUTPATIENT
Start: 2023-08-02 | End: 2023-08-05 | Stop reason: HOSPADM

## 2023-08-02 RX ORDER — FENTANYL CITRATE 50 UG/ML
INJECTION, SOLUTION INTRAMUSCULAR; INTRAVENOUS
Status: DISCONTINUED | OUTPATIENT
Start: 2023-08-02 | End: 2023-08-02

## 2023-08-02 RX ORDER — ACETAMINOPHEN 325 MG/1
650 TABLET ORAL EVERY 4 HOURS PRN
Status: DISCONTINUED | OUTPATIENT
Start: 2023-08-02 | End: 2023-08-05 | Stop reason: HOSPADM

## 2023-08-02 RX ORDER — ALBUMIN HUMAN 250 G/1000ML
SOLUTION INTRAVENOUS
Status: COMPLETED
Start: 2023-08-02 | End: 2023-08-02

## 2023-08-02 RX ORDER — HYDROCHLOROTHIAZIDE 12.5 MG/1
12.5 TABLET ORAL DAILY
Status: DISCONTINUED | OUTPATIENT
Start: 2023-08-02 | End: 2023-08-04

## 2023-08-02 RX ORDER — QUETIAPINE FUMARATE 100 MG/1
200 TABLET, FILM COATED ORAL NIGHTLY
Status: DISCONTINUED | OUTPATIENT
Start: 2023-08-02 | End: 2023-08-05 | Stop reason: HOSPADM

## 2023-08-02 RX ORDER — IPRATROPIUM BROMIDE AND ALBUTEROL SULFATE 2.5; .5 MG/3ML; MG/3ML
3 SOLUTION RESPIRATORY (INHALATION) ONCE AS NEEDED
Status: DISCONTINUED | OUTPATIENT
Start: 2023-08-02 | End: 2023-08-02 | Stop reason: HOSPADM

## 2023-08-02 RX ORDER — MORPHINE SULFATE 4 MG/ML
4 INJECTION, SOLUTION INTRAMUSCULAR; INTRAVENOUS
Status: DISCONTINUED | OUTPATIENT
Start: 2023-08-02 | End: 2023-08-05 | Stop reason: HOSPADM

## 2023-08-02 RX ORDER — DEXAMETHASONE SODIUM PHOSPHATE 4 MG/ML
INJECTION, SOLUTION INTRA-ARTICULAR; INTRALESIONAL; INTRAMUSCULAR; INTRAVENOUS; SOFT TISSUE
Status: DISCONTINUED | OUTPATIENT
Start: 2023-08-02 | End: 2023-08-02

## 2023-08-02 RX ORDER — EPHEDRINE SULFATE 50 MG/ML
INJECTION, SOLUTION INTRAVENOUS
Status: DISCONTINUED | OUTPATIENT
Start: 2023-08-02 | End: 2023-08-02

## 2023-08-02 RX ORDER — AMOXICILLIN 250 MG
2 CAPSULE ORAL 2 TIMES DAILY
Status: DISCONTINUED | OUTPATIENT
Start: 2023-08-02 | End: 2023-08-05 | Stop reason: HOSPADM

## 2023-08-02 RX ADMIN — HYDROMORPHONE HYDROCHLORIDE 0.5 MG: 2 INJECTION, SOLUTION INTRAMUSCULAR; INTRAVENOUS; SUBCUTANEOUS at 11:08

## 2023-08-02 RX ADMIN — SODIUM CHLORIDE: 9 INJECTION, SOLUTION INTRAVENOUS at 03:08

## 2023-08-02 RX ADMIN — GLYCOPYRROLATE 0.2 MG: 0.2 INJECTION INTRAMUSCULAR; INTRAVENOUS at 08:08

## 2023-08-02 RX ADMIN — ONDANSETRON 4 MG: 2 INJECTION INTRAMUSCULAR; INTRAVENOUS at 11:08

## 2023-08-02 RX ADMIN — HYDROMORPHONE HYDROCHLORIDE 0.5 MG: 2 INJECTION INTRAMUSCULAR; INTRAVENOUS; SUBCUTANEOUS at 02:08

## 2023-08-02 RX ADMIN — Medication 100 MCG: at 11:08

## 2023-08-02 RX ADMIN — SUGAMMADEX 100 MG: 100 INJECTION, SOLUTION INTRAVENOUS at 09:08

## 2023-08-02 RX ADMIN — Medication 100 MCG: at 12:08

## 2023-08-02 RX ADMIN — HYDROMORPHONE HYDROCHLORIDE 0.5 MG: 2 INJECTION INTRAMUSCULAR; INTRAVENOUS; SUBCUTANEOUS at 01:08

## 2023-08-02 RX ADMIN — SODIUM CHLORIDE, SODIUM GLUCONATE, SODIUM ACETATE, POTASSIUM CHLORIDE AND MAGNESIUM CHLORIDE: 526; 502; 368; 37; 30 INJECTION, SOLUTION INTRAVENOUS at 08:08

## 2023-08-02 RX ADMIN — LIDOCAINE HYDROCHLORIDE 60 MG: 20 INJECTION, SOLUTION EPIDURAL; INFILTRATION; INTRACAUDAL; PERINEURAL at 08:08

## 2023-08-02 RX ADMIN — ALBUMIN HUMAN 25 G: 250 SOLUTION INTRAVENOUS at 01:08

## 2023-08-02 RX ADMIN — FENTANYL CITRATE 50 MCG: 50 INJECTION, SOLUTION INTRAMUSCULAR; INTRAVENOUS at 08:08

## 2023-08-02 RX ADMIN — EPHEDRINE SULFATE 20 MG: 50 INJECTION INTRAVENOUS at 12:08

## 2023-08-02 RX ADMIN — ALBUMIN (HUMAN) 25 G: 12.5 SOLUTION INTRAVENOUS at 01:08

## 2023-08-02 RX ADMIN — PHENYLEPHRINE HYDROCHLORIDE 20 MCG/MIN: 10 INJECTION INTRAVENOUS at 09:08

## 2023-08-02 RX ADMIN — HYDROCODONE BITARTRATE AND ACETAMINOPHEN 2 TABLET: 7.5; 325 TABLET ORAL at 10:08

## 2023-08-02 RX ADMIN — SODIUM CHLORIDE, SODIUM GLUCONATE, SODIUM ACETATE, POTASSIUM CHLORIDE AND MAGNESIUM CHLORIDE: 526; 502; 368; 37; 30 INJECTION, SOLUTION INTRAVENOUS at 09:08

## 2023-08-02 RX ADMIN — QUETIAPINE FUMARATE 200 MG: 100 TABLET ORAL at 08:08

## 2023-08-02 RX ADMIN — FENTANYL CITRATE 50 MCG: 50 INJECTION, SOLUTION INTRAMUSCULAR; INTRAVENOUS at 09:08

## 2023-08-02 RX ADMIN — CEFAZOLIN SODIUM 2 G: 2 SOLUTION INTRAVENOUS at 09:08

## 2023-08-02 RX ADMIN — HYDROMORPHONE HYDROCHLORIDE 0.5 MG: 2 INJECTION, SOLUTION INTRAMUSCULAR; INTRAVENOUS; SUBCUTANEOUS at 12:08

## 2023-08-02 RX ADMIN — EPHEDRINE SULFATE 15 MG: 50 INJECTION INTRAVENOUS at 12:08

## 2023-08-02 RX ADMIN — SENNOSIDES AND DOCUSATE SODIUM 2 TABLET: 50; 8.6 TABLET ORAL at 08:08

## 2023-08-02 RX ADMIN — MIDAZOLAM HYDROCHLORIDE 2 MG: 1 INJECTION, SOLUTION INTRAMUSCULAR; INTRAVENOUS at 08:08

## 2023-08-02 RX ADMIN — HYDROCODONE BITARTRATE AND ACETAMINOPHEN 1 TABLET: 10; 325 TABLET ORAL at 04:08

## 2023-08-02 RX ADMIN — CEFAZOLIN SODIUM 2 G: 2 SOLUTION INTRAVENOUS at 03:08

## 2023-08-02 RX ADMIN — ACETAMINOPHEN 1000 MG: 10 INJECTION, SOLUTION INTRAVENOUS at 09:08

## 2023-08-02 RX ADMIN — GLYCOPYRROLATE 0.2 MG: 0.2 INJECTION INTRAMUSCULAR; INTRAVENOUS at 10:08

## 2023-08-02 RX ADMIN — HYDROMORPHONE HYDROCHLORIDE 1 MG: 2 INJECTION, SOLUTION INTRAMUSCULAR; INTRAVENOUS; SUBCUTANEOUS at 12:08

## 2023-08-02 RX ADMIN — SCOPOLAMINE 1 PATCH: 1 PATCH TRANSDERMAL at 08:08

## 2023-08-02 RX ADMIN — METOPROLOL TARTRATE 25 MG: 25 TABLET, FILM COATED ORAL at 08:08

## 2023-08-02 RX ADMIN — PROPOFOL 100 MG: 10 INJECTION, EMULSION INTRAVENOUS at 08:08

## 2023-08-02 RX ADMIN — ROCURONIUM BROMIDE 50 MG: 10 SOLUTION INTRAVENOUS at 08:08

## 2023-08-02 RX ADMIN — DEXAMETHASONE SODIUM PHOSPHATE 4 MG: 4 INJECTION, SOLUTION INTRA-ARTICULAR; INTRALESIONAL; INTRAMUSCULAR; INTRAVENOUS; SOFT TISSUE at 09:08

## 2023-08-02 NOTE — INTERVAL H&P NOTE
The H&P is unchanged    Surgery risks, benefits and alternative options discussed and understood by patient/family.          There are no hospital problems to display for this patient.

## 2023-08-02 NOTE — ANESTHESIA POSTPROCEDURE EVALUATION
Anesthesia Post Evaluation    Patient: Alisa Maradiaga    Procedure(s) Performed: Procedure(s) (LRB):  ROBOTIC FUSION,SPINE,LUMBAR,PLIF (N/A)    Final Anesthesia Type: general      Patient location during evaluation: PACU  Patient participation: Yes- Able to Participate  Level of consciousness: awake and alert  Post-procedure vital signs: reviewed and stable  Pain management: adequate  Airway patency: patent      Anesthetic complications: no      Cardiovascular status: hemodynamically stable  Respiratory status: unassisted  Hydration status: euvolemic  Follow-up not needed.          Vitals Value Taken Time   /65 08/02/23 1411   Temp 36.7 08/02/23 1416   Pulse 86 08/02/23 1415   Resp 16 08/02/23 1416   SpO2 98 % 08/02/23 1415   Vitals shown include unvalidated device data.      No case tracking events are documented in the log.      Pain/Lopez Score: Lopez Score: 8 (8/2/2023  1:00 PM)

## 2023-08-02 NOTE — BRIEF OP NOTE
[facility] - Periop Services  Brief Operative Note    SUMMARY     Surgery Date: 8/2/2023     Surgeon(s) and Role:     * Bailey Prather MD - Primary    Assisting Surgeon: Zaid Paz PA-C    Pre-op Diagnosis:  Spinal stenosis, lumbar region, with neurogenic claudication [M48.062]    Post-op Diagnosis:  Post-Op Diagnosis Codes:     * Spinal stenosis, lumbar region, with neurogenic claudication [M48.062]    Procedure(s) (LRB):  ROBOTIC FUSION,SPINE,LUMBAR,PLIF (N/A)    Open L3-L5 posterior decompression and fusion using OneBreath Robot, Lexa screws (Amari L3: 6.5x40; Lt L4: 6.5x40; Amari L5: 6.5x40), and Medtronic Matrix. C arm and microscope assisted.      Anesthesia: General    Operative Findings: Dictated    Estimated Blood Loss: 300 mL    Estimated Blood Loss has been documented.         Specimens:   Specimen (24h ago, onward)      None            [id number]

## 2023-08-02 NOTE — TRANSFER OF CARE
Anesthesia Transfer of Care Note    Patient: Alisa Maradiaga    Procedure(s) Performed: Procedure(s) (LRB):  ROBOTIC FUSION,SPINE,LUMBAR,PLIF (N/A)    Patient location: PACU    Anesthesia Type: general    Transport from OR: Transported from OR on room air with adequate spontaneous ventilation    Post pain: adequate analgesia    Post assessment: no apparent anesthetic complications and tolerated procedure well    Post vital signs: stable    Level of consciousness: responds to stimulation    Nausea/Vomiting: no nausea/vomiting    Complications: none    Transfer of care protocol was followed

## 2023-08-02 NOTE — NURSING
Nurses Note -- 4 Eyes      8/2/2023   5:00 PM      Skin assessed during: Admit      [] No Altered Skin Integrity Present    []Prevention Measures Documented      [x] Yes- Altered Skin Integrity Present or Discovered   [x] LDA Added if Not in Epic (Describe Wound)   [] New Altered Skin Integrity was Present on Admit and Documented in LDA   [] Wound Image Taken    Wound Care Consulted? No, surgical incision    Attending Nurse:  Vivi Lee    Second RN/Staff Member:   Carrie Flores Rn

## 2023-08-02 NOTE — ANESTHESIA PREPROCEDURE EVALUATION
08/02/2023  Alisa Maradiaga is a 68 y.o., female- seen by cardiology for preop assessment:        Presenting for PLIF L3-5  Has had prior lumbar and cervical sx in 2021  Pre-op Assessment    I have reviewed the Patient Summary Reports.     I have reviewed the Nursing Notes. I have reviewed the NPO Status.   I have reviewed the Medications.     Review of Systems  Anesthesia Hx:  No problems with previous Anesthesia    Social:  Non-Smoker    Cardiovascular:   Hypertension CAD   PVD    Psych:   Psychiatric History depression          Physical Exam  General: Well nourished, Cooperative, Alert and Oriented    Airway:  Mallampati: II   Mouth Opening: Normal  TM Distance: Normal  Tongue: Normal  Neck ROM: Normal ROM    Dental:  Intact        Anesthesia Plan  Type of Anesthesia, risks & benefits discussed:    Anesthesia Type: Gen ETT  Intra-op Monitoring Plan: Standard ASA Monitors and Art Line  Post Op Pain Control Plan: multimodal analgesia and IV/PO Opioids PRN  Airway Plan: Direct, Post-Induction  Informed Consent: Informed consent signed with the Patient and all parties understand the risks and agree with anesthesia plan.  All questions answered. Patient consented to blood products? Yes  ASA Score: 3  Day of Surgery Review of History & Physical: H&P Update referred to the surgeon/provider.    Ready For Surgery From Anesthesia Perspective.     .

## 2023-08-02 NOTE — ANESTHESIA PROCEDURE NOTES
Arterial    Diagnosis: Lumbar stenosis    Patient location during procedure: done in OR    Staffing  Authorizing Provider: Melodie Roman MD  Performing Provider: Melodie Roman MD    Staffing  Performed by: Melodie Roman MD  Authorized by: Melodie Roman MD    Anesthesiologist was present at the time of the procedure.    Preanesthetic Checklist  Completed: patient identified, IV checked, site marked, risks and benefits discussed, surgical consent, monitors and equipment checked, pre-op evaluation, timeout performed and anesthesia consent givenArterial  Skin Prep: chlorhexidine gluconate  Local Infiltration: lidocaine  Orientation: left  Location: radial    Catheter Size: 20 G  Catheter placement by Anatomical landmarks. Heme positive aspiration all ports. Insertion Attempts: 1  Assessment  Dressing: secured with tape and tegaderm  Patient: Tolerated well

## 2023-08-02 NOTE — ANESTHESIA PROCEDURE NOTES
Intubation    Date/Time: 8/2/2023 8:54 AM    Performed by: Niurka Vinson  Authorized by: Melodie Roman MD    Intubation:     Induction:  Intravenous    Intubated:  Postinduction    Mask Ventilation:  Easy mask    Attempts:  2    Attempted By:  Student    Method of Intubation:  Direct    Blade:  Buenrostro 2    Laryngeal View Grade: Grade III - only epiglottis visible      Attempted By (2nd Attempt):  CRNA    Method of Intubation (2nd Attempt):  Direct    Blade (2nd Attempt):  Buenrostro 2    Laryngeal View Grade (2nd Attempt): Grade IIa - cords partially seen      Difficult Airway Encountered?: No      Complications:  None    Airway Device:  Oral endotracheal tube    Airway Device Size:  7.0    Style/Cuff Inflation:  Cuffed (inflated to minimal occlusive pressure)    Tube secured:  22    Secured at:  The lips    Placement Verified By:  Capnometry    Complicating Factors:  None    Findings Post-Intubation:  BS equal bilateral and atraumatic/condition of teeth unchanged

## 2023-08-03 LAB
ANION GAP SERPL CALC-SCNC: 5 MEQ/L
BASOPHILS # BLD AUTO: 0.01 X10(3)/MCL
BASOPHILS NFR BLD AUTO: 0.1 %
BUN SERPL-MCNC: 12.7 MG/DL (ref 9.8–20.1)
CALCIUM SERPL-MCNC: 8.5 MG/DL (ref 8.4–10.2)
CHLORIDE SERPL-SCNC: 105 MMOL/L (ref 98–107)
CO2 SERPL-SCNC: 28 MMOL/L (ref 23–31)
CREAT SERPL-MCNC: 0.8 MG/DL (ref 0.55–1.02)
CREAT/UREA NIT SERPL: 16
EOSINOPHIL # BLD AUTO: 0.03 X10(3)/MCL (ref 0–0.9)
EOSINOPHIL NFR BLD AUTO: 0.3 %
ERYTHROCYTE [DISTWIDTH] IN BLOOD BY AUTOMATED COUNT: 13.1 % (ref 11.5–17)
GFR SERPLBLD CREATININE-BSD FMLA CKD-EPI: >60 MLS/MIN/1.73/M2
GLUCOSE SERPL-MCNC: 110 MG/DL (ref 82–115)
HCT VFR BLD AUTO: 29 % (ref 37–47)
HGB BLD-MCNC: 9.7 G/DL (ref 12–16)
IMM GRANULOCYTES # BLD AUTO: 0.04 X10(3)/MCL (ref 0–0.04)
IMM GRANULOCYTES NFR BLD AUTO: 0.4 %
LYMPHOCYTES # BLD AUTO: 1.7 X10(3)/MCL (ref 0.6–4.6)
LYMPHOCYTES NFR BLD AUTO: 18.3 %
MCH RBC QN AUTO: 32.4 PG (ref 27–31)
MCHC RBC AUTO-ENTMCNC: 33.4 G/DL (ref 33–36)
MCV RBC AUTO: 97 FL (ref 80–94)
MONOCYTES # BLD AUTO: 0.8 X10(3)/MCL (ref 0.1–1.3)
MONOCYTES NFR BLD AUTO: 8.6 %
NEUTROPHILS # BLD AUTO: 6.7 X10(3)/MCL (ref 2.1–9.2)
NEUTROPHILS NFR BLD AUTO: 72.3 %
NRBC BLD AUTO-RTO: 0 %
PLATELET # BLD AUTO: 184 X10(3)/MCL (ref 130–400)
PMV BLD AUTO: 9.6 FL (ref 7.4–10.4)
POTASSIUM SERPL-SCNC: 4.6 MMOL/L (ref 3.5–5.1)
RBC # BLD AUTO: 2.99 X10(6)/MCL (ref 4.2–5.4)
SODIUM SERPL-SCNC: 138 MMOL/L (ref 136–145)
WBC # SPEC AUTO: 9.28 X10(3)/MCL (ref 4.5–11.5)

## 2023-08-03 PROCEDURE — 97162 PT EVAL MOD COMPLEX 30 MIN: CPT

## 2023-08-03 PROCEDURE — 63600175 PHARM REV CODE 636 W HCPCS

## 2023-08-03 PROCEDURE — 11000001 HC ACUTE MED/SURG PRIVATE ROOM

## 2023-08-03 PROCEDURE — 80048 BASIC METABOLIC PNL TOTAL CA: CPT

## 2023-08-03 PROCEDURE — 85025 COMPLETE CBC W/AUTO DIFF WBC: CPT

## 2023-08-03 PROCEDURE — 97165 OT EVAL LOW COMPLEX 30 MIN: CPT

## 2023-08-03 PROCEDURE — 25000003 PHARM REV CODE 250: Performed by: NEUROLOGICAL SURGERY

## 2023-08-03 PROCEDURE — 25000003 PHARM REV CODE 250

## 2023-08-03 RX ADMIN — CEFAZOLIN SODIUM 2 G: 2 SOLUTION INTRAVENOUS at 12:08

## 2023-08-03 RX ADMIN — MUPIROCIN: 20 OINTMENT TOPICAL at 08:08

## 2023-08-03 RX ADMIN — SENNOSIDES AND DOCUSATE SODIUM 2 TABLET: 50; 8.6 TABLET ORAL at 08:08

## 2023-08-03 RX ADMIN — MUPIROCIN: 20 OINTMENT TOPICAL at 09:08

## 2023-08-03 RX ADMIN — SENNOSIDES AND DOCUSATE SODIUM 2 TABLET: 50; 8.6 TABLET ORAL at 09:08

## 2023-08-03 RX ADMIN — LISINOPRIL 20 MG: 20 TABLET ORAL at 04:08

## 2023-08-03 RX ADMIN — QUETIAPINE FUMARATE 200 MG: 100 TABLET ORAL at 08:08

## 2023-08-03 RX ADMIN — METOPROLOL TARTRATE 25 MG: 25 TABLET, FILM COATED ORAL at 08:08

## 2023-08-03 RX ADMIN — HYDROCODONE BITARTRATE AND ACETAMINOPHEN 1 TABLET: 10; 325 TABLET ORAL at 04:08

## 2023-08-03 RX ADMIN — HYDROCHLOROTHIAZIDE 12.5 MG: 12.5 TABLET ORAL at 04:08

## 2023-08-03 RX ADMIN — ESCITALOPRAM OXALATE 20 MG: 10 TABLET ORAL at 09:08

## 2023-08-03 RX ADMIN — HYDROCODONE BITARTRATE AND ACETAMINOPHEN 2 TABLET: 7.5; 325 TABLET ORAL at 09:08

## 2023-08-03 NOTE — PT/OT/SLP EVAL
Physical Therapy Evaluation and Discharge Note    Patient Name:  Alisa Maradiaga   MRN:  [id number]    Recommendations:     Discharge Recommendations: home with home health  Discharge Equipment Recommendations: none   Barriers to discharge: None    Assessment:     Alisa Maradiaga is a 68 y.o. female admitted with a medical diagnosis of Spinal stenosis, lumbar region, with neurogenic claudication. Pt is able to ambulate in hallways with no AD.  At this time, patient is functioning at their prior level of function and does not require further acute PT services.     Recent Surgery: Procedure(s) (LRB):  ROBOTIC FUSION,SPINE,LUMBAR,PLIF (N/A) 1 Day Post-Op    Plan:     During this hospitalization, patient does not require further acute PT services.  Please re-consult if situation changes.      Subjective     Chief Complaint: none  Patient/Family Comments/goals: return home  Pain/Comfort:       Patients cultural, spiritual, Mu-ism conflicts given the current situation:      Living Environment:  Home with , SL home, no steps to enter.   Prior to admission, patients level of function was independent.  Equipment used at home: none.  DME owned (not currently used): rolling walker.  Upon discharge, patient will have assistance from .    Objective:     Communicated with NSG prior to session.  Patient found ambulatory in room/[place] with SEBASTIEN drain upon PT entry to room.    General Precautions: Standard, fall    Orthopedic Precautions:N/A   Braces: LSO  Respiratory Status: Room air  Blood Pressure: NA    Exams:  RLE ROM: WFL  RLE Strength: WFL  LLE ROM: WFL  LLE Strength: WFL    Functional Mobility:  Bed Mobility:     Scooting: modified independence  Supine to Sit: modified independence  Sit to Supine: modified independence  Transfers:     Sit to Stand:  modified independence with rolling walker  Gait: pt ambulates 200+ feet, with no AD    Patient provided with verbal education regarding POC/dc recs.  Understanding  was verbalized.     Patient left supine with all lines intact, call button in reach, and NSG notified.    GOALS:   Multidisciplinary Problems       Physical Therapy Goals       Not on file                    History:     Past Medical History:   Diagnosis Date    Arthritis     Chronic back pain     Depression     Hyperlipidemia     Hypertension     Insomnia     Spinal stenosis, lumbar region, with neurogenic claudication        Past Surgical History:   Procedure Laterality Date    BREAST BIOPSY Bilateral     CAROTID ENDARTERECTOMY Left     CERVICAL FUSION      HYSTERECTOMY      LUMBAR SPINE SURGERY      x3    TUBAL LIGATION         Time Tracking:     PT Received On: 08/03/23  PT Start Time: 1030     PT Stop Time: 1045  PT Total Time (min): 15 min     Billable Minutes: Evaluation 15      08/03/2023

## 2023-08-03 NOTE — OP NOTE
[facility]                       [address] [address]    PATIENT NAME:      MAY AGUILAR  YOB: 1955  CSN:               [id number]  MRN:               [id number]  ADMIT DATE:        08/02/2023 06:20:00  PHYSICIAN:         Bailey Prather MD                          OPERATIVE REPORT      DATE OF SURGERY:    08/02/2023 00:00:00    SURGEON:  Bailey Prather MD    ASSISTANT:  Zaid Paz.    PREOPERATIVE DIAGNOSES:  Back pain, leg pain, status post previous surgery with   foraminal stenosis and instability at L3-4 and L4-5.    POSTOPERATIVE DIAGNOSES:  Back pain, leg pain, status post previous surgery with   foraminal stenosis and instability at L3-4 and L4-5.    PROCEDURES:  Open decompression, removal of pressure of nerve roots at L3, L4,   L5, subsequent fusion using Lexa screws at L3, L4, and L5, and posterolateral   fusion with Medtronic DBM.  We used the robot [employer] after placement of the   screws.  Monitoring was used.  Microscope was used.    There were no issues, no complications.    INDICATIONS FOR SURGERY:  A 68-year-old lady with severe back pain, two   surgeries in the past by other physician and seems to limit the movement with   increasing back pain, leg pain with walking.  She had MRI myelogram done that   showed narrowing stenosis at L3-4, L4-5 foraminal pressure.  She also had   conservative treatment including therapy, injection with improvement, now here   for open decompression at L3-4, L4-5, , placement of pedicle screws and   posterolateral fusion.  Consent was obtained.  Risks, benefits discussed.  Risks   of bleeding, infection, weakness, prior CSF leak discussed in detail.  She   wants me to proceed with surgery.  She understands that if she does the surgery,   then this may not completely help, but she wants me to proceed with surgery.    The consent was obtained in the office  several times and discussed.    OPERATIVE PROCEDURE:  The patient was brought to the operating room, intubated,   Morris placed, turned prone, monitoring attached.  Back was prepped and draped.    We did x-ray confirming levels.  Then we did incision from L3, L4, L5 exposing   On each side.  I identified L3, L4, L5.  Once we had done that, we appropriately checked   the levels again to make sure it was nice and dry and then we brought the Pongo Resumeor   robot in and put screws on the left side at L3, L4, and L5.  On the right side,   we put screws in L3 and L5, but not able to get screw placement at L4.  Final   x-rays looked good.  There were no changes on monitoring as well.  With this, we then brought   the microscope in.  We did laminectomy, medial facetectomy, scar tissue removed   gradually, removed bone and drilling down.  There was a lot of overgrowth,   especially on the right side with significant amount of foraminal pressure.  We   kept thinning the nerve roots, the bone, the scar, and ligament until we could   see the nerve roots at L3-4, L4-5 on each side.  With this, we kept cleaning   up.  Hemostasis was obtained.  We took one more bone off and then we   decorticated the sides and packed with autograft and allograft.  Bars were put   on top.  Final tightening was done and once that was really nice and tight, a   drain was left in place, secured.  Wound was closed in multiple layers of 0   Vicryl, 3-0 Vicryl running subcutaneous.        ______________________________  MD LAVELL Martin/CHANEL  DD:  08/03/2023  Time:  06:12AM  DT:  08/03/2023  Time:  09:13AM  Job #:  [id number]      OPERATIVE REPORT

## 2023-08-03 NOTE — PLAN OF CARE
Visited with pt and her . Pt is up walking in [place] with brace in place.  will assist as needed and pt has walkers, canes, etc at home. It does not appear these will be needed but available.   Pt has PCP   Dr ivette maloney home tomorrow.  No needs

## 2023-08-03 NOTE — PT/OT/SLP EVAL
Occupational Therapy   Evaluation and Discharge Note    Name: Alisa Maradiaga  MRN: [id number]  Admitting Diagnosis: Spinal stenosis, lumbar region, with neurogenic claudication  Recent Surgery: Procedure(s) (LRB):  ROBOTIC FUSION,SPINE,LUMBAR,PLIF (N/A) 1 Day Post-Op    Recommendations:     Discharge Recommendations: home  Discharge Equipment Recommendations: none  Barriers to discharge:  None    Assessment:     Alisa Maradiaga is a 68 y.o. female with a medical diagnosis of Spinal stenosis, lumbar region, with neurogenic claudication s/p L3-5 PLIF. At this time, patient is completing ADLs and mobility with Mod I and does not require further acute OT services. Pt educated on spinal precautions- verbalized and demonstrated understanding. Pt ok to d/c home c assist from her spouse.     Plan:     During this hospitalization, patient does not require further acute OT services.  Please re-consult if situation changes.    Plan of Care Reviewed with: patient, spouse    Subjective     Chief Complaint: Fatigue with extended time standing   Patient/Family Comments/goals: To go home     Occupational Profile:  Living Environment: Pt lives in a [facility] c her spouse. Reported she has a shower and a shower chair.   Previous level of function: IND c ADLs and mobility   Equipment Used at home: shower chair  Assistance upon Discharge: Pt's spouse will be able to assist at d/c.     Pain/Comfort:  Location 1: back  Pain Addressed 1: Reposition, Nurse notified    Patients cultural, spiritual, Yarsani conflicts given the current situation: no    Objective:     Communicated with: RN prior to session.  Patient found HOB elevated with SCD, SEBASTIEN drain upon OT entry to room. SEBASTIEN drain noted to be unplugged and contents spilt in bed. RN notified.     General Precautions: Standard, fall  Orthopedic Precautions: spinal precautions  Braces: LSO  Respiratory Status: Room air     Occupational Performance:    Bed Mobility:    Patient completed  Scooting/Bridging with modified independence  Patient completed Supine to Sit with modified independence  Patient completed Sit to Supine with modified independence    Functional Mobility/Transfers:  Patient completed Sit <> Stand Transfer with modified independence  with  no assistive device   Patient completed Toilet Transfer Step Transfer technique with modified independence with  no AD  Functional Mobility: Pt ambulated to the bathroom without AD c Mod I.     Activities of Daily Living:  Grooming: modified independence to brush teeth and wash face while standing at sink   Upper Body Dressing: modified independence Don LSO   Lower Body Dressing: modified independence Doff/don socks  Toileting: modified independence for pericare and clothing management    Cognitive/Visual Perceptual:  Cognitive/Psychosocial Skills:     -       Mood/Affect/Coping skills/emotional control: Appropriate to situation and Cooperative    Physical Exam:  Upper Extremity Strength:    -       Right Upper Extremity: WNL  -       Left Upper Extremity: WNL        Therapeutic Positioning  Risk for acquired pressure injuries is decreased due to ability to mobilize independently .    Patient Education:  Patient and spouse provided with verbal education regarding OT role/goals/POC, post op precautions, fall prevention, and Discharge/DME recommendations.  Understanding was verbalized.     Patient left up in chair with all lines intact and call button in reach    GOALS:   Multidisciplinary Problems       Occupational Therapy Goals       Not on file                    History:     Past Medical History:   Diagnosis Date    Arthritis     Chronic back pain     Depression     Hyperlipidemia     Hypertension     Insomnia     Spinal stenosis, lumbar region, with neurogenic claudication          Past Surgical History:   Procedure Laterality Date    BREAST BIOPSY Bilateral     CAROTID ENDARTERECTOMY Left     CERVICAL FUSION      HYSTERECTOMY      LUMBAR  SPINE SURGERY      x3    TUBAL LIGATION         Time Tracking:     OT Date of Treatment: 08/03/23  OT Start Time: 0847  OT Stop Time: 0910  OT Total Time (min): 23 min    Billable Minutes:Evaluation Low complexity     8/3/2023

## 2023-08-04 LAB
BASOPHILS # BLD AUTO: 0.05 X10(3)/MCL
BASOPHILS NFR BLD AUTO: 0.6 %
EOSINOPHIL # BLD AUTO: 0.15 X10(3)/MCL (ref 0–0.9)
EOSINOPHIL NFR BLD AUTO: 1.8 %
ERYTHROCYTE [DISTWIDTH] IN BLOOD BY AUTOMATED COUNT: 13.3 % (ref 11.5–17)
HCT VFR BLD AUTO: 31.4 % (ref 37–47)
HGB BLD-MCNC: 10.5 G/DL (ref 12–16)
IMM GRANULOCYTES # BLD AUTO: 0.05 X10(3)/MCL (ref 0–0.04)
IMM GRANULOCYTES NFR BLD AUTO: 0.6 %
LYMPHOCYTES # BLD AUTO: 2.53 X10(3)/MCL (ref 0.6–4.6)
LYMPHOCYTES NFR BLD AUTO: 30.1 %
MCH RBC QN AUTO: 32.7 PG (ref 27–31)
MCHC RBC AUTO-ENTMCNC: 33.4 G/DL (ref 33–36)
MCV RBC AUTO: 97.8 FL (ref 80–94)
MONOCYTES # BLD AUTO: 0.93 X10(3)/MCL (ref 0.1–1.3)
MONOCYTES NFR BLD AUTO: 11.1 %
NEUTROPHILS # BLD AUTO: 4.7 X10(3)/MCL (ref 2.1–9.2)
NEUTROPHILS NFR BLD AUTO: 55.8 %
NRBC BLD AUTO-RTO: 0 %
PLATELET # BLD AUTO: 201 X10(3)/MCL (ref 130–400)
PMV BLD AUTO: 9.8 FL (ref 7.4–10.4)
RBC # BLD AUTO: 3.21 X10(6)/MCL (ref 4.2–5.4)
WBC # SPEC AUTO: 8.41 X10(3)/MCL (ref 4.5–11.5)

## 2023-08-04 PROCEDURE — 85025 COMPLETE CBC W/AUTO DIFF WBC: CPT | Performed by: NEUROLOGICAL SURGERY

## 2023-08-04 PROCEDURE — 11000001 HC ACUTE MED/SURG PRIVATE ROOM

## 2023-08-04 PROCEDURE — 25000003 PHARM REV CODE 250: Performed by: NEUROLOGICAL SURGERY

## 2023-08-04 PROCEDURE — 63600175 PHARM REV CODE 636 W HCPCS

## 2023-08-04 PROCEDURE — 25000003 PHARM REV CODE 250: Performed by: INTERNAL MEDICINE

## 2023-08-04 PROCEDURE — 25000003 PHARM REV CODE 250

## 2023-08-04 RX ORDER — LISINOPRIL 10 MG/1
10 TABLET ORAL DAILY
Status: DISCONTINUED | OUTPATIENT
Start: 2023-08-04 | End: 2023-08-05 | Stop reason: HOSPADM

## 2023-08-04 RX ADMIN — QUETIAPINE FUMARATE 200 MG: 100 TABLET ORAL at 08:08

## 2023-08-04 RX ADMIN — SENNOSIDES AND DOCUSATE SODIUM 2 TABLET: 50; 8.6 TABLET ORAL at 08:08

## 2023-08-04 RX ADMIN — ENOXAPARIN SODIUM 40 MG: 40 INJECTION SUBCUTANEOUS at 04:08

## 2023-08-04 RX ADMIN — ESCITALOPRAM OXALATE 20 MG: 10 TABLET ORAL at 09:08

## 2023-08-04 RX ADMIN — LISINOPRIL 10 MG: 10 TABLET ORAL at 04:08

## 2023-08-04 RX ADMIN — SENNOSIDES AND DOCUSATE SODIUM 2 TABLET: 50; 8.6 TABLET ORAL at 09:08

## 2023-08-04 RX ADMIN — MUPIROCIN: 20 OINTMENT TOPICAL at 09:08

## 2023-08-04 RX ADMIN — HYDROCODONE BITARTRATE AND ACETAMINOPHEN 2 TABLET: 7.5; 325 TABLET ORAL at 04:08

## 2023-08-04 RX ADMIN — HYDROCODONE BITARTRATE AND ACETAMINOPHEN 2 TABLET: 7.5; 325 TABLET ORAL at 08:08

## 2023-08-04 RX ADMIN — HYDROCODONE BITARTRATE AND ACETAMINOPHEN 1 TABLET: 10; 325 TABLET ORAL at 04:08

## 2023-08-04 NOTE — CONSULTS
[facility] Hospital Medicine Consultation Note        Patient Name: Alisa Maradiaga  MRN: [id number]  Patient Class: IP- Inpatient   Admission Date: 8/2/2023  6:20 AM  Length of Stay: 2  Attending Physician: [unfilled]   Primary Care Provider: Dave Mcleod MD  Face-to-Face encounter date: 08/04/2023 g  Consulting Physician: Hospital Medicine - Bipin Samuel MD  Reason for Consult: Medical management  Chief Complaint: No chief complaint on file.        Patient information was obtained from patient, patient's family, past medical records.    HISTORY OF PRESENT ILLNESS:     68-year-old  woman with history of hypertension, hyperlipidemia, depression, chronic back pain due to foraminal stenosis and instability at L3-L4 and L4-L5 open decompression, removal of pressure of nerve roots at L3-L4, L5, subsequent fusion at L3, L4, L5 and posterolateral fusion with Medtronic device.  Patient underwent procedure on 08/02/2023 by Dr. Prather.  Hospital medicine consulted for medical management regarding fluctuating blood pressures, intermittent hypotension requiring holding of antihypertensives.  When seen at bedside she was alert, oriented, comfortably resting.  Pain is well controlled with medications.  Ambulating well.   was at bedside.  At baseline she is independent with ADLs except for above-mentioned chronic back pain.  PAST MEDICAL HISTORY:     Past Medical History:   Diagnosis Date    Arthritis     Chronic back pain     Depression     Hyperlipidemia     Hypertension     Insomnia     Spinal stenosis, lumbar region, with neurogenic claudication        PAST SURGICAL HISTORY:     Past Surgical History:   Procedure Laterality Date    BREAST BIOPSY Bilateral     CAROTID ENDARTERECTOMY Left     CERVICAL FUSION      HYSTERECTOMY      LUMBAR SPINE SURGERY      x3    TUBAL LIGATION         ALLERGIES:   Patient has no known allergies.    FAMILY HISTORY:   Reviewed and  negative    SOCIAL HISTORY:     Social History     Tobacco Use    Smoking status: Never    Smokeless tobacco: Never   Substance Use Topics    Alcohol use: Yes     Comment: socially        HOME MEDICATIONS:     Prior to Admission medications    Medication Sig Start Date End Date Taking? Authorizing Provider   atorvastatin (LIPITOR) 20 MG tablet TAKE 1 TABLET BY MOUTH ONCE DAILY ON SATURDAY AND SUNDAY 6/19/23  Yes Provider, Historical   cholecalciferol, vitamin D3, (VITAMIN D3) 50 mcg (2,000 unit) Tab 1 tablet once daily.   Yes Provider, Historical   EScitalopram oxalate (LEXAPRO) 20 MG tablet Take 20 mg by mouth once daily.   Yes Provider, Historical   hydroCHLOROthiazide (HYDRODIURIL) 12.5 MG Tab Take 12.5 mg by mouth Daily. 5/8/23  Yes Provider, Historical   lisinopriL (PRINIVIL,ZESTRIL) 20 MG tablet Take 20 mg by mouth Daily. 5/8/23  Yes Provider, Historical   metoprolol tartrate (LOPRESSOR) 25 MG tablet Take 25 mg by mouth 2 (two) times daily. 5/8/23  Yes Provider, Historical   QUEtiapine (SEROQUEL) 200 MG Tab Take 200 mg by mouth every evening. 5/8/23  Yes Provider, Historical       REVIEW OF SYSTEMS:   Except as documented, all other systems reviewed and negative     PHYSICAL EXAM:     VITAL SIGNS: 24 HRS MIN & MAX LAST   Temp  Min: 97.6 °F (36.4 °C)  Max: 98.9 °F (37.2 °C) 97.6 °F (36.4 °C)   BP  Min: 81/52  Max: 142/86 130/76   Pulse  Min: 71  Max: 93  85   Resp  Min: 18  Max: 20 20   SpO2  Min: 92 %  Max: 99 % (!) 93 %       Vitals Reviewed  General appearance: Well-developed, well-nourished female in no apparent distress.  HENT: Atraumatic head. Moist mucous membranes of oral cavity.  Eyes: Normal extraocular movements.   Neck: Supple.   Lungs: Clear to auscultation bilaterally. No wheezing present.   Heart: Regular rate and rhythm. S1 and S2 present with no murmurs/gallop/rub. No pedal edema. No JVD present.   Abdomen: Soft, non-distended, non-tender. No rebound tenderness/guarding. Bowel sounds are  normal.   Extremities: No cyanosis, clubbing, or edema.  Skin:  Surgical site dressed, intact  Neuro: Motor and sensory exams grossly intact. Good tone. Muscle strength 5/5 in all 4 extremities  Psych/mental status: Appropriate mood and affect. Responds appropriately to questions.     LABS AND IMAGING:     Recent Labs   Lab 08/02/23  0642 08/03/23  0449 08/04/23  0943   WBC 6.02 9.28 8.41   RBC 4.37 2.99* 3.21*   HGB 14.1 9.7* 10.5*   HCT 41.2 29.0* 31.4*   MCV 94.3* 97.0* 97.8*   MCH 32.3* 32.4* 32.7*   MCHC 34.2 33.4 33.4   RDW 12.6 13.1 13.3    184 201   MPV 9.5 9.6 9.8       Recent Labs   Lab 08/03/23  0449      K 4.6   CO2 28   BUN 12.7   CREATININE 0.80   CALCIUM 8.5        Microbiology Results (last 7 days)       ** No results found for the last 168 hours. **               ASSESSMENT & PLAN:       Hypertension, intermittent hypotension  Hyperlipidemia  Depression  Chronic anemia  Chronic back pain due to foraminal stenosis and instability at L3-L4 and L4-L5 open decompression  S/p removal of pressure of nerve roots at L3-L4, L5, subsequent fusion at L3, L4, L5 and posterolateral fusion 8/2/23    Plan:  -continue regular diet.  Discontinue hydrochlorothiazide.  Reduce lisinopril to 10 mg daily.  P.o. intake as tolerated.  Encourage mobility  -analgesics as needed.  Encouraged to limit analgesic.  - educated on medication changes.  Patient will continue to follow with her cardiologist.    -Patient can be discharged tomorrow if blood pressure remains stable with MAP above 65    Thank you for the consultation.  We will continue to follow.          VTE Prophylaxis: Already on Lovenox    __________________________________________________________________________  INPATIENT LIST OF MEDICATIONS     Current Facility-Administered Medications:     0.9%  NaCl infusion, , Intravenous, Continuous, Zaid Paz PA, Last Rate: 100 mL/hr at 08/02/23 1500, New Bag at 08/02/23 1500    acetaminophen tablet 650 mg,  650 mg, Oral, Q6H PRN, Zaid Paz PA    acetaminophen tablet 650 mg, 650 mg, Oral, Q4H PRN, Zaid Paz PA    aluminum-magnesium hydroxide-simethicone 200-200-20 mg/5 mL suspension 30 mL, 30 mL, Oral, Q4H PRN, Zaid Paz PA    atorvastatin tablet 20 mg, 20 mg, Oral, Daily, Zaid Paz PA    bisacodyL suppository 10 mg, 10 mg, Rectal, Daily, Zaid Paz PA    calcium carbonate 200 mg calcium (500 mg) chewable tablet 500 mg, 500 mg, Oral, Daily PRN, Zaid Paz PA    cefazolin (ANCEF) 2 gram in dextrose 5% 50 mL IVPB (premix), 2 g, Intravenous, On Call Procedure, Zaid Paz PA, 2 g at 08/02/23 0900    cyclobenzaprine tablet 10 mg, 10 mg, Oral, TID PRN, Zaid Paz PA    enoxaparin injection 40 mg, 40 mg, Subcutaneous, Daily, Zaid Paz PA    EScitalopram oxalate tablet 20 mg, 20 mg, Oral, Daily, Zaid Paz PA, 20 mg at 08/04/23 0905    HYDROcodone-acetaminophen  mg per tablet 1 tablet, 1 tablet, Oral, Q4H PRN, Zaid Paz PA, 1 tablet at 08/03/23 1642    HYDROcodone-acetaminophen 5-325 mg per tablet 1 tablet, 1 tablet, Oral, Q4H PRN, Zaid Paz PA    HYDROcodone-acetaminophen 7.5-325 mg per tablet 2 tablet, 2 tablet, Oral, Q4H PRN, Zaid Paz PA, 2 tablet at 08/04/23 0459    lisinopriL tablet 10 mg, 10 mg, Oral, Daily, Bipin Samuel MD    magnesium hydroxide 400 mg/5 ml suspension 2,400 mg, 30 mL, Oral, Daily PRN, Zaid Paz PA    metoprolol tartrate (LOPRESSOR) tablet 25 mg, 25 mg, Oral, BID, Zaid Paz PA, 25 mg at 08/03/23 2036    morphine injection 1 mg, 1 mg, Intravenous, Q1H PRN, Zaid Paz PA    morphine injection 2 mg, 2 mg, Intravenous, Q1H PRN, Zaid Paz PA    morphine injection 4 mg, 4 mg, Intravenous, Q3H PRN, Day, INO Mar    mupirocin 2 % ointment, , Nasal, BID, Bailey Prather MD, Given at 08/04/23 0905    ondansetron disintegrating tablet 4 mg, 4 mg, Oral, Q6H PRN, Brandi, INO Mar    prochlorperazine  injection Soln 10 mg, 10 mg, Intravenous, Q6H PRN, Brandi, INO Mar    QUEtiapine tablet 200 mg, 200 mg, Oral, QHS, Zaid Paz PA, 200 mg at 08/03/23 2036    scopolamine 1.3-1.5 mg (1 mg over 3 days) 1 patch, 1 patch, Transdermal, Q3 Days, Melodie Roman MD, 1 patch at 08/02/23 0804    senna-docusate 8.6-50 mg per tablet 2 tablet, 2 tablet, Oral, BID, Zaid Paz PA, 2 tablet at 08/04/23 0905      Scheduled Meds:   atorvastatin  20 mg Oral Daily    bisacodyL  10 mg Rectal Daily    enoxparin  40 mg Subcutaneous Daily    EScitalopram oxalate  20 mg Oral Daily    lisinopriL  10 mg Oral Daily    metoprolol tartrate  25 mg Oral BID    mupirocin   Nasal BID    QUEtiapine  200 mg Oral QHS    scopolamine  1 patch Transdermal Q3 Days    senna-docusate 8.6-50 mg  2 tablet Oral BID     Continuous Infusions:   sodium chloride 0.9% 100 mL/hr at 08/02/23 1500     PRN Meds:.acetaminophen, acetaminophen, aluminum-magnesium hydroxide-simethicone, calcium carbonate, ceFAZolin 2 g/50mL Dextrose IVPB, cyclobenzaprine, HYDROcodone-acetaminophen, HYDROcodone-acetaminophen, HYDROcodone-acetaminophen, magnesium hydroxide 400 mg/5 ml, morphine, morphine, morphine, ondansetron, prochlorperazine    RADIOLOGY                                                                                                    SURG FL Surgery Fluoro Usage  See OP Notes for results.     IMPRESSION: See OP Notes for results.     This procedure was auto-finalized by: Virtual Radiologist            Bipin Samuel MD   Hospital Medicine Team  08/04/2023

## 2023-08-05 VITALS
TEMPERATURE: 97 F | RESPIRATION RATE: 18 BRPM | BODY MASS INDEX: 27.52 KG/M2 | SYSTOLIC BLOOD PRESSURE: 96 MMHG | HEART RATE: 89 BPM | DIASTOLIC BLOOD PRESSURE: 64 MMHG | HEIGHT: 60 IN | OXYGEN SATURATION: 96 % | WEIGHT: 140.19 LBS

## 2023-08-05 PROCEDURE — 25000003 PHARM REV CODE 250: Performed by: NEUROLOGICAL SURGERY

## 2023-08-05 PROCEDURE — 25000003 PHARM REV CODE 250

## 2023-08-05 RX ADMIN — MUPIROCIN: 20 OINTMENT TOPICAL at 09:08

## 2023-08-05 RX ADMIN — SENNOSIDES AND DOCUSATE SODIUM 2 TABLET: 50; 8.6 TABLET ORAL at 09:08

## 2023-08-05 RX ADMIN — ESCITALOPRAM OXALATE 20 MG: 10 TABLET ORAL at 09:08

## 2023-08-05 RX ADMIN — HYDROCODONE BITARTRATE AND ACETAMINOPHEN 2 TABLET: 7.5; 325 TABLET ORAL at 10:08

## 2023-08-05 RX ADMIN — ATORVASTATIN CALCIUM 20 MG: 10 TABLET, FILM COATED ORAL at 09:08

## 2023-08-05 RX ADMIN — HYDROCODONE BITARTRATE AND ACETAMINOPHEN 2 TABLET: 7.5; 325 TABLET ORAL at 06:08

## 2023-08-05 NOTE — HOSPITAL COURSE
Patient seen with her sister and her nurse at the bedside.  She apparently still had a systolic pressure around 92 this morning and her metoprolol was held.  She has no complaints about lightheadedness or chest pain or shortness a breath and has been ambulating around the room to the restroom.  She is making urine and has a good appetite.  She did receive some pain medicines overnight.  No other new issues at this time.

## 2023-08-05 NOTE — PLAN OF CARE
Problem: Adult Inpatient Plan of Care  Goal: Plan of Care Review  Outcome: Ongoing, Progressing  Goal: Patient-Specific Goal (Individualized)  Outcome: Ongoing, Progressing  Goal: Absence of Hospital-Acquired Illness or Injury  Outcome: Ongoing, Progressing  Goal: Optimal Comfort and Wellbeing  Outcome: Ongoing, Progressing  Goal: Readiness for Transition of Care  Outcome: Ongoing, Progressing     Problem: Infection  Goal: Absence of Infection Signs and Symptoms  Outcome: Ongoing, Progressing     Problem: Fall Injury Risk  Goal: Absence of Fall and Fall-Related Injury  Outcome: Ongoing, Progressing     Problem: Skin Injury Risk Increased  Goal: Skin Health and Integrity  Outcome: Ongoing, Progressing     Problem: Pain Acute  Goal: Acceptable Pain Control and Functional Ability  Outcome: Ongoing, Progressing

## 2023-08-05 NOTE — SUBJECTIVE & OBJECTIVE
Review of Systems   Constitutional:  Negative for chills, fatigue and fever.   Respiratory: Negative.     Cardiovascular: Negative.    Gastrointestinal: Negative.    Genitourinary: Negative.    Musculoskeletal:  Positive for back pain.   Skin: Negative.    Neurological: Negative.    All other systems reviewed and are negative.    Objective:     Vital Signs (Most Recent):  Temp: 98.5 °F (36.9 °C) (08/05/23 0719)  Pulse: 82 (08/05/23 0719)  Resp: 20 (08/05/23 0719)  BP: 96/64 (08/05/23 0721)  SpO2: 96 % (08/05/23 0719) Vital Signs (24h Range):  Temp:  [97.6 °F (36.4 °C)-99 °F (37.2 °C)] 98.5 °F (36.9 °C)  Pulse:  [80-88] 82  Resp:  [18-20] 20  SpO2:  [90 %-97 %] 96 %  BP: ()/(52-76) 96/64     Weight: 63.6 kg (140 lb 3.4 oz)  Body mass index is 27.38 kg/m².    Intake/Output Summary (Last 24 hours) at 8/5/2023 0756  Last data filed at 8/5/2023 0639  Gross per 24 hour   Intake 480 ml   Output --   Net 480 ml         Physical Exam  Vitals and nursing note reviewed. Exam conducted with a chaperone present.   Constitutional:       Appearance: Normal appearance. She is normal weight.   HENT:      Head: Normocephalic and atraumatic.   Cardiovascular:      Rate and Rhythm: Normal rate and regular rhythm.      Pulses: Normal pulses.      Heart sounds: Normal heart sounds.   Pulmonary:      Effort: Pulmonary effort is normal.      Breath sounds: Normal breath sounds.   Abdominal:      General: Abdomen is flat. Bowel sounds are normal.      Palpations: Abdomen is soft.   Skin:     General: Skin is warm and dry.   Neurological:      General: No focal deficit present.      Mental Status: She is alert.   Psychiatric:         Mood and Affect: Mood normal.         Behavior: Behavior normal.             Significant Labs: All pertinent labs within the past 24 hours have been reviewed.    Significant Imaging: I have reviewed all pertinent imaging results/findings within the past 24 hours.

## 2023-08-05 NOTE — NURSING
Pt & family member received discharge teaching, rx, BP education/log/parameters, and followup info. They verbalized understanding. Pt stable and ready for discharge to private vehicle.

## 2023-08-05 NOTE — HPI
68-year-old  woman with history of hypertension, hyperlipidemia, depression, chronic back pain due to foraminal stenosis and instability at L3-L4 and L4-L5 open decompression, removal of pressure of nerve roots at L3-L4, L5, subsequent fusion at L3, L4, L5 and posterolateral fusion with Medtronic device.  Patient underwent procedure on 08/02/2023 by Dr. Prather.  Hospital medicine consulted for medical management regarding fluctuating blood pressures, intermittent hypotension requiring holding of antihypertensives.  When seen at bedside she was alert, oriented, comfortably resting.  Pain is well controlled with medications.  Ambulating well.   was at bedside.  At baseline she is independent with ADLs except for above-mentioned chronic back pain.

## 2023-08-05 NOTE — PROGRESS NOTES
POD#3 Open L3-L5 posterior decompression and fusion  She is sitting up in bed, NAD  She is having very little pain at the operative site  She currently denies numbness and tingling in bilateral LE  Her BP has been fluctuating but overall stable  She is ambulating without difficulty    AFVSS  Ga well, no deficits appreciated  Incision c/d/I  SEBASTIEN site dry    Plan: She is ready for dc today  She will monitor her BP at home and adjust meds accordingly  OK to leave incision open to air. OK to shower  She will f/u with Dr. Prather in 2 weeks  Rxs on chart

## 2023-08-05 NOTE — PROGRESS NOTES
[facility] Hospital Medicine  Progress Note    Patient Name: Alisa Maradiaga  MRN: [id number]  Patient Class: IP- Inpatient   Admission Date: 8/2/2023  Length of Stay: 3 days  Attending Physician: Bailey Prather MD  Primary Care Provider: Dave Mcleod MD        Subjective:     Principal Problem:Spinal stenosis, lumbar region, with neurogenic claudication        HPI:  68-year-old  woman with history of hypertension, hyperlipidemia, depression, chronic back pain due to foraminal stenosis and instability at L3-L4 and L4-L5 open decompression, removal of pressure of nerve roots at L3-L4, L5, subsequent fusion at L3, L4, L5 and posterolateral fusion with Medtronic device.  Patient underwent procedure on 08/02/2023 by Dr. Prather.  Hospital medicine consulted for medical management regarding fluctuating blood pressures, intermittent hypotension requiring holding of antihypertensives.  When seen at bedside she was alert, oriented, comfortably resting.  Pain is well controlled with medications.  Ambulating well.   was at bedside.  At baseline she is independent with ADLs except for above-mentioned chronic back pain.      Overview/Hospital Course:  Patient seen with her sister and her nurse at the bedside.  She apparently still had a systolic pressure around 92 this morning and her metoprolol was held.  She has no complaints about lightheadedness or chest pain or shortness a breath and has been ambulating around the room to the restroom.  She is making urine and has a good appetite.  She did receive some pain medicines overnight.  No other new issues at this time.          Review of Systems   Constitutional:  Negative for chills, fatigue and fever.   Respiratory: Negative.     Cardiovascular: Negative.    Gastrointestinal: Negative.    Genitourinary: Negative.    Musculoskeletal:  Positive for back pain.   Skin: Negative.    Neurological: Negative.    All other systems  reviewed and are negative.    Objective:     Vital Signs (Most Recent):  Temp: 98.5 °F (36.9 °C) (08/05/23 0719)  Pulse: 82 (08/05/23 0719)  Resp: 20 (08/05/23 0719)  BP: 96/64 (08/05/23 0721)  SpO2: 96 % (08/05/23 0719) Vital Signs (24h Range):  Temp:  [97.6 °F (36.4 °C)-99 °F (37.2 °C)] 98.5 °F (36.9 °C)  Pulse:  [80-88] 82  Resp:  [18-20] 20  SpO2:  [90 %-97 %] 96 %  BP: ()/(52-76) 96/64     Weight: 63.6 kg (140 lb 3.4 oz)  Body mass index is 27.38 kg/m².    Intake/Output Summary (Last 24 hours) at 8/5/2023 0756  Last data filed at 8/5/2023 0639  Gross per 24 hour   Intake 480 ml   Output --   Net 480 ml         Physical Exam  Vitals and nursing note reviewed. Exam conducted with a chaperone present.   Constitutional:       Appearance: Normal appearance. She is normal weight.   HENT:      Head: Normocephalic and atraumatic.   Cardiovascular:      Rate and Rhythm: Normal rate and regular rhythm.      Pulses: Normal pulses.      Heart sounds: Normal heart sounds.   Pulmonary:      Effort: Pulmonary effort is normal.      Breath sounds: Normal breath sounds.   Abdominal:      General: Abdomen is flat. Bowel sounds are normal.      Palpations: Abdomen is soft.   Skin:     General: Skin is warm and dry.   Neurological:      General: No focal deficit present.      Mental Status: She is alert.   Psychiatric:         Mood and Affect: Mood normal.         Behavior: Behavior normal.             Significant Labs: All pertinent labs within the past 24 hours have been reviewed.    Significant Imaging: I have reviewed all pertinent imaging results/findings within the past 24 hours.      Assessment/Plan:      No notes have been filed under this hospital service.  Service: Hospital Medicine    VTE Risk Mitigation (From admission, onward)         Ordered     enoxaparin injection 40 mg  Daily         08/02/23 0640     Place sequential compression device  Until discontinued         08/02/23 0640     Place sequential  compression device  Until discontinued         08/02/23 0623              Hypertension, intermittent hypotension  Hyperlipidemia  Depression  Chronic anemia  Chronic back pain due to foraminal stenosis and instability at L3-L4 and L4-L5 open decompression  S/p removal of pressure of nerve roots at L3-L4, L5, subsequent fusion at L3, L4, L5 and posterolateral fusion 8/2/23     Plan:  Patient is looking well this morning.  She has been on this regimen of blood pressure medications for quite some time through her primary care physician and her cardiologist.  From my standpoint she is stable to be discharged home and she plans to avoid narcotic pain medicine and switch to Tylenol.  She does have a blood pressure cuff at home and I recommended that she stay off of her blood pressure medications until her systolic blood pressure gets to be greater than 110 and she can restart her metoprolol.  Once it is higher than 120 she can restart her lisinopril and hydrochlorothiazide.  She should follow up with her primary care physician at the beginning of next week for evaluation.  Thank you for the consultation we will continue to follow with you.    Discharge Planning   BABATUNDE: 8/5/2023     Code Status: Not on file   Is the patient medically ready for discharge?:     Reason for patient still in hospital (select all that apply): Treatment  Discharge Plan A: Home with family                  Donna Frausto MD  Department of Hospital Medicine   [facility]

## 2023-08-05 NOTE — DISCHARGE INSTRUCTIONS
Follow up with primary care provider in 1 week. Hold BP med: metoprolol until Systolic (top number of BP) > 110, then okay to restart that med. Once Systolic is > 120 you can restart HCTZ (hydrochlorothiazide) & Lisinopril.   Okay to leave incision Open to air and okay to shower.

## 2023-08-08 NOTE — DISCHARGE SUMMARY
[facility]  Neurosurgery  Discharge Summary      Patient Name: Alisa Maradiaga  MRN: [id number]  Admission Date: 8/2/2023  Hospital Length of Stay: 3 days  Discharge Date and Time: 8/5/2023 11:17 AM  Attending Physician: Bailey Prather MD  Discharging Provider: INO Camacho  Primary Care Provider: Dave Mcleod MD     HPI: Ms. Maradiaga was evaluated outpatient for lumbar pain that radiated into the bilateral hips. He pain was exacerbated by standing and walking. She underwent MRI and CT myelogram lumbar spine that demonstrated L3-5 spondylolisthesis and post op changes. She was consented and scheduled for surgery.     Procedure(s) (LRB):  ROBOTIC FUSION,SPINE,LUMBAR,PLIF (N/A)     Hospital Course: She underwent Open L3-L5 posterior decompression and fusion on 8/2/23. She tolerated the procedure without complication. On post op day 1 she was doing well, her pain was controlled with medications, and she was ambulating. On post op day 2 she was experiencing hypotension and hospital medicine was consulted. On post op day 3 her pain was controlled with PO medications, she was ambulating, her SEBASTIEN drain had been removed, and her vital signs were stable. She was ready for discharge. She was given pain medications and muscle relaxer prescription. She was given wound care instructions and post op precautions. She was discharged home with 2 week post op follow up scheduled in office.     Consults:   Consults (From admission, onward)          Status Ordering Provider     Inpatient consult to [facility] Medicine-General  Once        Provider:  Selin Moser, DO    Completed BAILEY PRATHER             Pending Diagnostic Studies:       None          Final Active Diagnoses:    Diagnosis Date Noted POA    PRINCIPAL PROBLEM:  Spinal stenosis, lumbar region, with neurogenic claudication [M48.062] 08/02/2023 Yes      Problems Resolved During this Admission:      Discharged Condition:  stable    Disposition: Home or Self Care    Follow Up:   Follow-up Information       Bailey Prather MD. Go on 8/17/2023.    Specialty: Neurosurgery  Why: Appt time @130pm  Contact information:  [place] [id number] [phone]                           Patient Instructions:      Diet general   Order Comments: RETURN TO PREVIOUS DIET     Wound care routine (specify)   Order Comments: Wound care - dressings should be left in place for 48hrs following surgery. Bandages may be removed after 2 days and left open to air. You will see steri strips under your bandages-- DO NOT PEEL THEM OFF!  The steri strips will fall off spontaneously.  Any remaining steri strips will be removed at your post op visit.  Do NOT put any ointments, creams, or lotions on the incision unless otherwise instructed by your doctor.     Lifting restrictions   Order Comments: NO pushing, pulling or lifting for 6 weeks.  Do not lift anything heavier than 10 pounds.  At your post op visit you will be guided regarding increasing your activity.  Keep your automobile riding to a minimum and DO NOT drive until cleared by your doctor.     No driving, operating heavy equipment or signing legal documents while taking pain medication     Call MD for:  redness, tenderness, or signs of infection (pain, swelling, redness, odor or green/yellow discharge around incision site)     Call MD for:  persistent dizziness or light-headedness     Call MD for:  temperature >100.4     Call MD for:  severe uncontrolled pain     Remove dressing in 24 hours     Activity as tolerated   Scheduling Instructions: NO LIFTING OR STRAINING/ NO BENDING OR TWISTING     Medications:  Reconciled Home Medications:      Medication List        CONTINUE taking these medications      atorvastatin 20 MG tablet  Commonly known as: LIPITOR  TAKE 1 TABLET BY MOUTH ONCE DAILY ON SATURDAY AND SUNDAY     cholecalciferol (vitamin D3) 50 mcg (2,000 unit) Tab  Commonly known as:  VITAMIN D3  1 tablet once daily.     EScitalopram oxalate 20 MG tablet  Commonly known as: LEXAPRO  Take 20 mg by mouth once daily.     hydroCHLOROthiazide 12.5 MG Tab  Commonly known as: HYDRODIURIL  Take 12.5 mg by mouth Daily.     lisinopriL 20 MG tablet  Commonly known as: PRINIVIL,ZESTRIL  Take 20 mg by mouth Daily.     metoprolol tartrate 25 MG tablet  Commonly known as: LOPRESSOR  Take 25 mg by mouth 2 (two) times daily.     QUEtiapine 200 MG Tab  Commonly known as: SEROQUEL  Take 200 mg by mouth every evening.              INO Camacho  Neurosurgery  [facility]

## 2023-09-01 ENCOUNTER — HOSPITAL ENCOUNTER (OUTPATIENT)
Dept: RADIOLOGY | Facility: HOSPITAL | Age: 68
Discharge: HOME | End: 2023-09-01
Attending: INTERNAL MEDICINE
Payer: MEDICARE

## 2023-09-01 DIAGNOSIS — I70.0 ATHEROSCLEROSIS OF AORTA: ICD-10-CM

## 2023-09-01 PROCEDURE — 25500020 PHARM REV CODE 255

## 2023-09-01 PROCEDURE — 75635 CT ANGIO ABDOMINAL ARTERIES: CPT | Mod: TC

## 2023-09-01 RX ADMIN — IOPAMIDOL 100 ML: 755 INJECTION, SOLUTION INTRAVENOUS at 01:09

## 2023-10-16 ENCOUNTER — LAB VISIT (OUTPATIENT)
Dept: LAB | Facility: HOSPITAL | Age: 68
End: 2023-10-16
Attending: SPECIALIST
Payer: MEDICARE

## 2023-10-16 DIAGNOSIS — I71.40 ABDOMINAL AORTIC ANEURYSM WITHOUT RUPTURE: ICD-10-CM

## 2023-10-16 DIAGNOSIS — I70.213 ATHEROSCLEROSIS OF NATIVE ARTERY OF BOTH LOWER EXTREMITIES WITH INTERMITTENT CLAUDICATION: Primary | ICD-10-CM

## 2023-10-16 DIAGNOSIS — Z01.810 PRE-OPERATIVE CARDIOVASCULAR EXAMINATION: ICD-10-CM

## 2023-10-16 LAB
ANION GAP SERPL CALC-SCNC: 7 MEQ/L
BASOPHILS # BLD AUTO: 0.04 X10(3)/MCL
BASOPHILS NFR BLD AUTO: 0.6 %
BUN SERPL-MCNC: 13.4 MG/DL (ref 9.8–20.1)
CALCIUM SERPL-MCNC: 10.4 MG/DL (ref 8.4–10.2)
CHLORIDE SERPL-SCNC: 103 MMOL/L (ref 98–107)
CO2 SERPL-SCNC: 30 MMOL/L (ref 23–31)
CREAT SERPL-MCNC: 0.89 MG/DL (ref 0.55–1.02)
CREAT/UREA NIT SERPL: 15
EOSINOPHIL # BLD AUTO: 0.11 X10(3)/MCL (ref 0–0.9)
EOSINOPHIL NFR BLD AUTO: 1.7 %
ERYTHROCYTE [DISTWIDTH] IN BLOOD BY AUTOMATED COUNT: 13.7 % (ref 11.5–17)
GFR SERPLBLD CREATININE-BSD FMLA CKD-EPI: >60 MLS/MIN/1.73/M2
GLUCOSE SERPL-MCNC: 110 MG/DL (ref 82–115)
HCT VFR BLD AUTO: 45.7 % (ref 37–47)
HGB BLD-MCNC: 14.8 G/DL (ref 12–16)
IMM GRANULOCYTES # BLD AUTO: 0.03 X10(3)/MCL (ref 0–0.04)
IMM GRANULOCYTES NFR BLD AUTO: 0.5 %
LYMPHOCYTES # BLD AUTO: 2.12 X10(3)/MCL (ref 0.6–4.6)
LYMPHOCYTES NFR BLD AUTO: 33.4 %
MCH RBC QN AUTO: 30.8 PG (ref 27–31)
MCHC RBC AUTO-ENTMCNC: 32.4 G/DL (ref 33–36)
MCV RBC AUTO: 95.2 FL (ref 80–94)
MONOCYTES # BLD AUTO: 0.56 X10(3)/MCL (ref 0.1–1.3)
MONOCYTES NFR BLD AUTO: 8.8 %
NEUTROPHILS # BLD AUTO: 3.48 X10(3)/MCL (ref 2.1–9.2)
NEUTROPHILS NFR BLD AUTO: 55 %
NRBC BLD AUTO-RTO: 0 %
PLATELET # BLD AUTO: 291 X10(3)/MCL (ref 130–400)
PMV BLD AUTO: 9.5 FL (ref 7.4–10.4)
POTASSIUM SERPL-SCNC: 5.1 MMOL/L (ref 3.5–5.1)
RBC # BLD AUTO: 4.8 X10(6)/MCL (ref 4.2–5.4)
SODIUM SERPL-SCNC: 140 MMOL/L (ref 136–145)
WBC # SPEC AUTO: 6.34 X10(3)/MCL (ref 4.5–11.5)

## 2023-10-16 PROCEDURE — 36415 COLL VENOUS BLD VENIPUNCTURE: CPT

## 2023-10-16 PROCEDURE — 80048 BASIC METABOLIC PNL TOTAL CA: CPT

## 2023-10-16 PROCEDURE — 85025 COMPLETE CBC W/AUTO DIFF WBC: CPT

## 2023-12-07 ENCOUNTER — LAB VISIT (OUTPATIENT)
Dept: LAB | Facility: HOSPITAL | Age: 68
End: 2023-12-07
Attending: INTERNAL MEDICINE
Payer: MEDICARE

## 2023-12-07 DIAGNOSIS — E78.5 HYPERLIPIDEMIA, UNSPECIFIED HYPERLIPIDEMIA TYPE: ICD-10-CM

## 2023-12-07 DIAGNOSIS — I10 ESSENTIAL HYPERTENSION, MALIGNANT: ICD-10-CM

## 2023-12-07 DIAGNOSIS — D50.9 IRON DEFICIENCY ANEMIA, UNSPECIFIED: Primary | ICD-10-CM

## 2023-12-07 LAB
ALBUMIN SERPL-MCNC: 4.3 G/DL (ref 3.4–4.8)
ALBUMIN/GLOB SERPL: 1.4 RATIO (ref 1.1–2)
ALP SERPL-CCNC: 77 UNIT/L (ref 40–150)
ALT SERPL-CCNC: 60 UNIT/L (ref 0–55)
AST SERPL-CCNC: 49 UNIT/L (ref 5–34)
BASOPHILS # BLD AUTO: 0.06 X10(3)/MCL
BASOPHILS NFR BLD AUTO: 1 %
BILIRUB SERPL-MCNC: 0.4 MG/DL
BUN SERPL-MCNC: 14.3 MG/DL (ref 9.8–20.1)
CALCIUM SERPL-MCNC: 9.7 MG/DL (ref 8.4–10.2)
CHLORIDE SERPL-SCNC: 109 MMOL/L (ref 98–107)
CHOLEST SERPL-MCNC: 79 MG/DL
CHOLEST/HDLC SERPL: 2 {RATIO} (ref 0–5)
CK SERPL-CCNC: 47 U/L (ref 29–168)
CO2 SERPL-SCNC: 27 MMOL/L (ref 23–31)
CREAT SERPL-MCNC: 0.86 MG/DL (ref 0.55–1.02)
EOSINOPHIL # BLD AUTO: 0.13 X10(3)/MCL (ref 0–0.9)
EOSINOPHIL NFR BLD AUTO: 2.2 %
ERYTHROCYTE [DISTWIDTH] IN BLOOD BY AUTOMATED COUNT: 14.4 % (ref 11.5–17)
GFR SERPLBLD CREATININE-BSD FMLA CKD-EPI: >60 MLS/MIN/1.73/M2
GLOBULIN SER-MCNC: 3 GM/DL (ref 2.4–3.5)
GLUCOSE SERPL-MCNC: 111 MG/DL (ref 82–115)
HCT VFR BLD AUTO: 47.9 % (ref 37–47)
HDLC SERPL-MCNC: 32 MG/DL (ref 35–60)
HGB BLD-MCNC: 15.8 G/DL (ref 12–16)
IMM GRANULOCYTES # BLD AUTO: 0.02 X10(3)/MCL (ref 0–0.04)
IMM GRANULOCYTES NFR BLD AUTO: 0.3 %
LDLC SERPL CALC-MCNC: 22 MG/DL (ref 50–140)
LYMPHOCYTES # BLD AUTO: 2.86 X10(3)/MCL (ref 0.6–4.6)
LYMPHOCYTES NFR BLD AUTO: 49.1 %
MCH RBC QN AUTO: 30.6 PG (ref 27–31)
MCHC RBC AUTO-ENTMCNC: 33 G/DL (ref 33–36)
MCV RBC AUTO: 92.8 FL (ref 80–94)
MONOCYTES # BLD AUTO: 0.67 X10(3)/MCL (ref 0.1–1.3)
MONOCYTES NFR BLD AUTO: 11.5 %
NEUTROPHILS # BLD AUTO: 2.08 X10(3)/MCL (ref 2.1–9.2)
NEUTROPHILS NFR BLD AUTO: 35.9 %
NRBC BLD AUTO-RTO: 0 %
PLATELET # BLD AUTO: 281 X10(3)/MCL (ref 130–400)
PMV BLD AUTO: 8.9 FL (ref 7.4–10.4)
POTASSIUM SERPL-SCNC: 5.2 MMOL/L (ref 3.5–5.1)
PROT SERPL-MCNC: 7.3 GM/DL (ref 5.8–7.6)
RBC # BLD AUTO: 5.16 X10(6)/MCL (ref 4.2–5.4)
SODIUM SERPL-SCNC: 143 MMOL/L (ref 136–145)
TRIGL SERPL-MCNC: 126 MG/DL (ref 37–140)
VLDLC SERPL CALC-MCNC: 25 MG/DL
WBC # SPEC AUTO: 5.82 X10(3)/MCL (ref 4.5–11.5)

## 2023-12-07 PROCEDURE — 85025 COMPLETE CBC W/AUTO DIFF WBC: CPT

## 2023-12-07 PROCEDURE — 80053 COMPREHEN METABOLIC PANEL: CPT

## 2023-12-07 PROCEDURE — 80061 LIPID PANEL: CPT

## 2023-12-07 PROCEDURE — 82550 ASSAY OF CK (CPK): CPT

## 2023-12-07 PROCEDURE — 36415 COLL VENOUS BLD VENIPUNCTURE: CPT

## 2023-12-27 ENCOUNTER — LAB VISIT (OUTPATIENT)
Dept: LAB | Facility: HOSPITAL | Age: 68
End: 2023-12-27
Attending: INTERNAL MEDICINE
Payer: MEDICARE

## 2023-12-27 DIAGNOSIS — I20.89 ANGINA DECUBITUS: ICD-10-CM

## 2023-12-27 DIAGNOSIS — I10 ESSENTIAL HYPERTENSION, MALIGNANT: Primary | ICD-10-CM

## 2023-12-27 DIAGNOSIS — E78.5 HYPERLIPIDEMIA, UNSPECIFIED HYPERLIPIDEMIA TYPE: ICD-10-CM

## 2023-12-27 LAB
ALBUMIN SERPL-MCNC: 4.2 G/DL (ref 3.4–4.8)
ALBUMIN/GLOB SERPL: 1.4 RATIO (ref 1.1–2)
ALP SERPL-CCNC: 90 UNIT/L (ref 40–150)
ALT SERPL-CCNC: 91 UNIT/L (ref 0–55)
AST SERPL-CCNC: 66 UNIT/L (ref 5–34)
BILIRUB SERPL-MCNC: 0.6 MG/DL
BUN SERPL-MCNC: 17 MG/DL (ref 9.8–20.1)
CALCIUM SERPL-MCNC: 9.6 MG/DL (ref 8.4–10.2)
CHLORIDE SERPL-SCNC: 106 MMOL/L (ref 98–107)
CHOLEST SERPL-MCNC: 108 MG/DL
CHOLEST/HDLC SERPL: 3 {RATIO} (ref 0–5)
CK SERPL-CCNC: 32 U/L (ref 29–168)
CO2 SERPL-SCNC: 27 MMOL/L (ref 23–31)
CREAT SERPL-MCNC: 0.9 MG/DL (ref 0.55–1.02)
GFR SERPLBLD CREATININE-BSD FMLA CKD-EPI: >60 MLS/MIN/1.73/M2
GLOBULIN SER-MCNC: 3 GM/DL (ref 2.4–3.5)
GLUCOSE SERPL-MCNC: 102 MG/DL (ref 82–115)
HDLC SERPL-MCNC: 34 MG/DL (ref 35–60)
LDLC SERPL CALC-MCNC: 45 MG/DL (ref 50–140)
POTASSIUM SERPL-SCNC: 4.8 MMOL/L (ref 3.5–5.1)
PROT SERPL-MCNC: 7.2 GM/DL (ref 5.8–7.6)
SODIUM SERPL-SCNC: 141 MMOL/L (ref 136–145)
TRIGL SERPL-MCNC: 146 MG/DL (ref 37–140)
VLDLC SERPL CALC-MCNC: 29 MG/DL

## 2023-12-27 PROCEDURE — 80053 COMPREHEN METABOLIC PANEL: CPT

## 2023-12-27 PROCEDURE — 36415 COLL VENOUS BLD VENIPUNCTURE: CPT

## 2023-12-27 PROCEDURE — 80061 LIPID PANEL: CPT

## 2023-12-27 PROCEDURE — 82550 ASSAY OF CK (CPK): CPT

## 2024-02-07 ENCOUNTER — LAB VISIT (OUTPATIENT)
Dept: LAB | Facility: HOSPITAL | Age: 69
End: 2024-02-07
Attending: INTERNAL MEDICINE
Payer: MEDICARE

## 2024-02-07 DIAGNOSIS — Z11.59 SCREENING EXAMINATION FOR POLIOMYELITIS: ICD-10-CM

## 2024-02-07 DIAGNOSIS — R79.89 HYPOURICEMIA: ICD-10-CM

## 2024-02-07 DIAGNOSIS — Z01.84 IMMUNITY STATUS TESTING: ICD-10-CM

## 2024-02-07 DIAGNOSIS — Z72.89 OTHER PROBLEMS RELATED TO LIFESTYLE: Primary | ICD-10-CM

## 2024-02-07 LAB
ALBUMIN SERPL-MCNC: 4.4 G/DL (ref 3.4–4.8)
ALP SERPL-CCNC: 78 UNIT/L (ref 40–150)
ALT SERPL-CCNC: 94 UNIT/L (ref 0–55)
AST SERPL-CCNC: 78 UNIT/L (ref 5–34)
BILIRUB SERPL-MCNC: 0.6 MG/DL
BILIRUBIN DIRECT+TOT PNL SERPL-MCNC: 0.2 MG/DL (ref 0–?)
BILIRUBIN DIRECT+TOT PNL SERPL-MCNC: 0.4 MG/DL (ref 0–0.8)
PATH REV: NORMAL
PROT SERPL-MCNC: 7.6 GM/DL (ref 5.8–7.6)

## 2024-02-07 PROCEDURE — 83516 IMMUNOASSAY NONANTIBODY: CPT

## 2024-02-07 PROCEDURE — 86381 MITOCHONDRIAL ANTIBODY EACH: CPT

## 2024-02-07 PROCEDURE — 36415 COLL VENOUS BLD VENIPUNCTURE: CPT

## 2024-02-07 PROCEDURE — 86803 HEPATITIS C AB TEST: CPT

## 2024-02-07 PROCEDURE — 80076 HEPATIC FUNCTION PANEL: CPT

## 2024-02-08 LAB
HCV AB SERPL QL IA: NONREACTIVE
MITOCHONDRIA M2 AB SER IA-ACNC: <0.1 U

## 2024-02-12 LAB — SMA IGG SER-ACNC: 5.1 U

## 2024-03-25 ENCOUNTER — LAB VISIT (OUTPATIENT)
Dept: LAB | Facility: HOSPITAL | Age: 69
End: 2024-03-25
Attending: INTERNAL MEDICINE
Payer: MEDICARE

## 2024-03-25 DIAGNOSIS — E78.5 HYPERLIPIDEMIA, UNSPECIFIED HYPERLIPIDEMIA TYPE: ICD-10-CM

## 2024-03-25 DIAGNOSIS — I20.81 ANGINA PECTORIS WITH CORONARY MICROVASCULAR DYSFUNCTION: ICD-10-CM

## 2024-03-25 DIAGNOSIS — R06.00 DYSPNEA, UNSPECIFIED TYPE: Primary | ICD-10-CM

## 2024-03-25 LAB
ALBUMIN SERPL-MCNC: 4.1 G/DL (ref 3.4–4.8)
ALBUMIN/GLOB SERPL: 1.5 RATIO (ref 1.1–2)
ALP SERPL-CCNC: 79 UNIT/L (ref 40–150)
ALT SERPL-CCNC: 66 UNIT/L (ref 0–55)
AST SERPL-CCNC: 39 UNIT/L (ref 5–34)
BASOPHILS # BLD AUTO: 0.04 X10(3)/MCL
BASOPHILS NFR BLD AUTO: 0.6 %
BILIRUB SERPL-MCNC: 0.4 MG/DL
BUN SERPL-MCNC: 15.5 MG/DL (ref 9.8–20.1)
CALCIUM SERPL-MCNC: 9.3 MG/DL (ref 8.4–10.2)
CHLORIDE SERPL-SCNC: 107 MMOL/L (ref 98–107)
CHOLEST SERPL-MCNC: 121 MG/DL
CHOLEST/HDLC SERPL: 4 {RATIO} (ref 0–5)
CO2 SERPL-SCNC: 26 MMOL/L (ref 23–31)
CREAT SERPL-MCNC: 0.84 MG/DL (ref 0.55–1.02)
EOSINOPHIL # BLD AUTO: 0.12 X10(3)/MCL (ref 0–0.9)
EOSINOPHIL NFR BLD AUTO: 1.7 %
ERYTHROCYTE [DISTWIDTH] IN BLOOD BY AUTOMATED COUNT: 13 % (ref 11.5–17)
GFR SERPLBLD CREATININE-BSD FMLA CKD-EPI: >60 MLS/MIN/1.73/M2
GLOBULIN SER-MCNC: 2.8 GM/DL (ref 2.4–3.5)
GLUCOSE SERPL-MCNC: 109 MG/DL (ref 82–115)
HCT VFR BLD AUTO: 47.9 % (ref 37–47)
HDLC SERPL-MCNC: 28 MG/DL (ref 35–60)
HGB BLD-MCNC: 15.8 G/DL (ref 12–16)
IMM GRANULOCYTES # BLD AUTO: 0.02 X10(3)/MCL (ref 0–0.04)
IMM GRANULOCYTES NFR BLD AUTO: 0.3 %
LDLC SERPL CALC-MCNC: 65 MG/DL (ref 50–140)
LYMPHOCYTES # BLD AUTO: 2.27 X10(3)/MCL (ref 0.6–4.6)
LYMPHOCYTES NFR BLD AUTO: 31.9 %
MCH RBC QN AUTO: 31.9 PG (ref 27–31)
MCHC RBC AUTO-ENTMCNC: 33 G/DL (ref 33–36)
MCV RBC AUTO: 96.8 FL (ref 80–94)
MONOCYTES # BLD AUTO: 0.59 X10(3)/MCL (ref 0.1–1.3)
MONOCYTES NFR BLD AUTO: 8.3 %
NEUTROPHILS # BLD AUTO: 4.07 X10(3)/MCL (ref 2.1–9.2)
NEUTROPHILS NFR BLD AUTO: 57.2 %
NRBC BLD AUTO-RTO: 0 %
PLATELET # BLD AUTO: 250 X10(3)/MCL (ref 130–400)
PMV BLD AUTO: 9.4 FL (ref 7.4–10.4)
POTASSIUM SERPL-SCNC: 4.2 MMOL/L (ref 3.5–5.1)
PROT SERPL-MCNC: 6.9 GM/DL (ref 5.8–7.6)
RBC # BLD AUTO: 4.95 X10(6)/MCL (ref 4.2–5.4)
SODIUM SERPL-SCNC: 142 MMOL/L (ref 136–145)
TRIGL SERPL-MCNC: 139 MG/DL (ref 37–140)
VLDLC SERPL CALC-MCNC: 28 MG/DL
WBC # SPEC AUTO: 7.11 X10(3)/MCL (ref 4.5–11.5)

## 2024-03-25 PROCEDURE — 85025 COMPLETE CBC W/AUTO DIFF WBC: CPT

## 2024-03-25 PROCEDURE — 36415 COLL VENOUS BLD VENIPUNCTURE: CPT

## 2024-03-25 PROCEDURE — 80061 LIPID PANEL: CPT

## 2024-03-25 PROCEDURE — 80053 COMPREHEN METABOLIC PANEL: CPT

## 2024-11-25 ENCOUNTER — LAB VISIT (OUTPATIENT)
Dept: LAB | Facility: HOSPITAL | Age: 69
End: 2024-11-25
Attending: INTERNAL MEDICINE
Payer: MEDICARE

## 2024-11-25 DIAGNOSIS — R73.01 IMPAIRED FASTING GLUCOSE: Primary | ICD-10-CM

## 2024-11-25 DIAGNOSIS — E78.5 HYPERLIPIDEMIA, UNSPECIFIED HYPERLIPIDEMIA TYPE: ICD-10-CM

## 2024-11-25 LAB
ALBUMIN SERPL-MCNC: 4 G/DL (ref 3.4–4.8)
ALBUMIN/GLOB SERPL: 1.4 RATIO (ref 1.1–2)
ALP SERPL-CCNC: 66 UNIT/L (ref 40–150)
ALT SERPL-CCNC: 88 UNIT/L (ref 0–55)
ANION GAP SERPL CALC-SCNC: 8 MEQ/L
AST SERPL-CCNC: 56 UNIT/L (ref 5–34)
BILIRUB SERPL-MCNC: 0.4 MG/DL
BUN SERPL-MCNC: 14 MG/DL (ref 9.8–20.1)
CALCIUM SERPL-MCNC: 9.5 MG/DL (ref 8.4–10.2)
CHLORIDE SERPL-SCNC: 105 MMOL/L (ref 98–107)
CHOLEST SERPL-MCNC: 136 MG/DL
CHOLEST/HDLC SERPL: 5 {RATIO} (ref 0–5)
CK SERPL-CCNC: 54 U/L (ref 29–168)
CO2 SERPL-SCNC: 29 MMOL/L (ref 23–31)
CREAT SERPL-MCNC: 0.89 MG/DL (ref 0.55–1.02)
CREAT/UREA NIT SERPL: 16
GFR SERPLBLD CREATININE-BSD FMLA CKD-EPI: >60 ML/MIN/1.73/M2
GLOBULIN SER-MCNC: 2.8 GM/DL (ref 2.4–3.5)
GLUCOSE SERPL-MCNC: 110 MG/DL (ref 82–115)
HDLC SERPL-MCNC: 27 MG/DL (ref 35–60)
LDLC SERPL CALC-MCNC: 81 MG/DL (ref 50–140)
POTASSIUM SERPL-SCNC: 4.9 MMOL/L (ref 3.5–5.1)
PROT SERPL-MCNC: 6.8 GM/DL (ref 5.8–7.6)
SODIUM SERPL-SCNC: 142 MMOL/L (ref 136–145)
TRIGL SERPL-MCNC: 139 MG/DL (ref 37–140)
VLDLC SERPL CALC-MCNC: 28 MG/DL

## 2024-11-25 PROCEDURE — 36415 COLL VENOUS BLD VENIPUNCTURE: CPT

## 2024-11-25 PROCEDURE — 80053 COMPREHEN METABOLIC PANEL: CPT

## 2024-11-25 PROCEDURE — 80061 LIPID PANEL: CPT

## 2024-11-25 PROCEDURE — 82550 ASSAY OF CK (CPK): CPT

## 2025-06-11 ENCOUNTER — HOSPITAL ENCOUNTER (INPATIENT)
Facility: HOSPITAL | Age: 70
LOS: 3 days | Discharge: HOME | DRG: 305 | End: 2025-06-14
Attending: EMERGENCY MEDICINE | Admitting: INTERNAL MEDICINE
Payer: MEDICARE

## 2025-06-11 DIAGNOSIS — R41.0 DISORIENTATION: ICD-10-CM

## 2025-06-11 DIAGNOSIS — G45.9 TIA (TRANSIENT ISCHEMIC ATTACK): Primary | ICD-10-CM

## 2025-06-11 DIAGNOSIS — R53.1 GENERALIZED WEAKNESS: ICD-10-CM

## 2025-06-11 DIAGNOSIS — R07.9 CHEST PAIN: ICD-10-CM

## 2025-06-11 DIAGNOSIS — R42 DIZZINESS: ICD-10-CM

## 2025-06-11 LAB
ALBUMIN SERPL-MCNC: 3.9 G/DL (ref 3.4–4.8)
ALBUMIN/GLOB SERPL: 1.2 RATIO (ref 1.1–2)
ALP SERPL-CCNC: 65 UNIT/L (ref 40–150)
ALT SERPL-CCNC: 60 UNIT/L (ref 0–55)
ANION GAP SERPL CALC-SCNC: 9 MEQ/L
AST SERPL-CCNC: 46 UNIT/L (ref 11–45)
BACTERIA #/AREA URNS AUTO: ABNORMAL /HPF
BASOPHILS # BLD AUTO: 0.05 X10(3)/MCL
BASOPHILS NFR BLD AUTO: 0.7 %
BILIRUB SERPL-MCNC: 0.5 MG/DL
BILIRUB UR QL STRIP.AUTO: NEGATIVE
BUN SERPL-MCNC: 12.2 MG/DL (ref 9.8–20.1)
CALCIUM SERPL-MCNC: 9.1 MG/DL (ref 8.4–10.2)
CHLORIDE SERPL-SCNC: 103 MMOL/L (ref 98–107)
CLARITY UR: CLEAR
CO2 SERPL-SCNC: 27 MMOL/L (ref 23–31)
COLOR UR AUTO: COLORLESS
CREAT SERPL-MCNC: 0.84 MG/DL (ref 0.55–1.02)
CREAT/UREA NIT SERPL: 15
EOSINOPHIL # BLD AUTO: 0.08 X10(3)/MCL (ref 0–0.9)
EOSINOPHIL NFR BLD AUTO: 1.1 %
ERYTHROCYTE [DISTWIDTH] IN BLOOD BY AUTOMATED COUNT: 12.9 % (ref 11.5–17)
GFR SERPLBLD CREATININE-BSD FMLA CKD-EPI: >60 ML/MIN/1.73/M2
GLOBULIN SER-MCNC: 3.3 GM/DL (ref 2.4–3.5)
GLUCOSE SERPL-MCNC: 164 MG/DL (ref 82–115)
GLUCOSE UR QL STRIP: NORMAL
HCT VFR BLD AUTO: 46.9 % (ref 37–47)
HGB BLD-MCNC: 15.4 G/DL (ref 12–16)
HGB UR QL STRIP: NEGATIVE
IMM GRANULOCYTES # BLD AUTO: 0.03 X10(3)/MCL (ref 0–0.04)
IMM GRANULOCYTES NFR BLD AUTO: 0.4 %
KETONES UR QL STRIP: NEGATIVE
LEUKOCYTE ESTERASE UR QL STRIP: NEGATIVE
LYMPHOCYTES # BLD AUTO: 1.71 X10(3)/MCL (ref 0.6–4.6)
LYMPHOCYTES NFR BLD AUTO: 24.6 %
MAGNESIUM SERPL-MCNC: 1.9 MG/DL (ref 1.6–2.6)
MCH RBC QN AUTO: 31.9 PG (ref 27–31)
MCHC RBC AUTO-ENTMCNC: 32.8 G/DL (ref 33–36)
MCV RBC AUTO: 97.1 FL (ref 80–94)
MONOCYTES # BLD AUTO: 0.58 X10(3)/MCL (ref 0.1–1.3)
MONOCYTES NFR BLD AUTO: 8.3 %
NEUTROPHILS # BLD AUTO: 4.51 X10(3)/MCL (ref 2.1–9.2)
NEUTROPHILS NFR BLD AUTO: 64.9 %
NITRITE UR QL STRIP: NEGATIVE
NRBC BLD AUTO-RTO: 0 %
OHS QRS DURATION: 88 MS
OHS QTC CALCULATION: 457 MS
PH UR STRIP: 6.5 [PH]
PLATELET # BLD AUTO: 259 X10(3)/MCL (ref 130–400)
PMV BLD AUTO: 9.7 FL (ref 7.4–10.4)
POCT GLUCOSE: 210 MG/DL (ref 70–110)
POTASSIUM SERPL-SCNC: 4.3 MMOL/L (ref 3.5–5.1)
PROT SERPL-MCNC: 7.2 GM/DL (ref 5.8–7.6)
PROT UR QL STRIP: NEGATIVE
RBC # BLD AUTO: 4.83 X10(6)/MCL (ref 4.2–5.4)
RBC #/AREA URNS AUTO: ABNORMAL /HPF
SODIUM SERPL-SCNC: 139 MMOL/L (ref 136–145)
SP GR UR STRIP.AUTO: 1 (ref 1–1.03)
SQUAMOUS #/AREA URNS LPF: ABNORMAL /HPF
TROPONIN I SERPL-MCNC: <0.01 NG/ML (ref 0–0.04)
UROBILINOGEN UR STRIP-ACNC: NORMAL
WBC # BLD AUTO: 6.96 X10(3)/MCL (ref 4.5–11.5)
WBC #/AREA URNS AUTO: ABNORMAL /HPF

## 2025-06-11 PROCEDURE — 25000003 PHARM REV CODE 250: Performed by: INTERNAL MEDICINE

## 2025-06-11 PROCEDURE — 85025 COMPLETE CBC W/AUTO DIFF WBC: CPT

## 2025-06-11 PROCEDURE — 93010 ELECTROCARDIOGRAM REPORT: CPT | Mod: ,,, | Performed by: INTERNAL MEDICINE

## 2025-06-11 PROCEDURE — 93005 ELECTROCARDIOGRAM TRACING: CPT

## 2025-06-11 PROCEDURE — 99285 EMERGENCY DEPT VISIT HI MDM: CPT | Mod: 25

## 2025-06-11 PROCEDURE — 21400001 HC TELEMETRY ROOM

## 2025-06-11 PROCEDURE — 84484 ASSAY OF TROPONIN QUANT: CPT

## 2025-06-11 PROCEDURE — 83735 ASSAY OF MAGNESIUM: CPT

## 2025-06-11 PROCEDURE — 80053 COMPREHEN METABOLIC PANEL: CPT

## 2025-06-11 PROCEDURE — 11000001 HC ACUTE MED/SURG PRIVATE ROOM

## 2025-06-11 PROCEDURE — 82962 GLUCOSE BLOOD TEST: CPT

## 2025-06-11 PROCEDURE — 81001 URINALYSIS AUTO W/SCOPE: CPT

## 2025-06-11 RX ORDER — ESCITALOPRAM OXALATE 10 MG/1
20 TABLET ORAL DAILY
Status: DISCONTINUED | OUTPATIENT
Start: 2025-06-12 | End: 2025-06-14 | Stop reason: HOSPADM

## 2025-06-11 RX ORDER — IBUPROFEN 200 MG
24 TABLET ORAL
Status: DISCONTINUED | OUTPATIENT
Start: 2025-06-11 | End: 2025-06-14 | Stop reason: HOSPADM

## 2025-06-11 RX ORDER — LISINOPRIL 20 MG/1
20 TABLET ORAL DAILY
Status: DISCONTINUED | OUTPATIENT
Start: 2025-06-12 | End: 2025-06-14 | Stop reason: HOSPADM

## 2025-06-11 RX ORDER — METRONIDAZOLE 500 MG/1
500 TABLET ORAL 3 TIMES DAILY
COMMUNITY

## 2025-06-11 RX ORDER — HYDROCHLOROTHIAZIDE 12.5 MG/1
12.5 TABLET ORAL DAILY
Status: DISCONTINUED | OUTPATIENT
Start: 2025-06-12 | End: 2025-06-13

## 2025-06-11 RX ORDER — MECLIZINE HYDROCHLORIDE 25 MG/1
25 TABLET ORAL 3 TIMES DAILY PRN
Status: DISCONTINUED | OUTPATIENT
Start: 2025-06-11 | End: 2025-06-14 | Stop reason: HOSPADM

## 2025-06-11 RX ORDER — LANOLIN ALCOHOL/MO/W.PET/CERES
400 CREAM (GRAM) TOPICAL DAILY
COMMUNITY

## 2025-06-11 RX ORDER — HYDRALAZINE HYDROCHLORIDE 20 MG/ML
10 INJECTION INTRAMUSCULAR; INTRAVENOUS EVERY 4 HOURS PRN
Status: DISCONTINUED | OUTPATIENT
Start: 2025-06-11 | End: 2025-06-14 | Stop reason: HOSPADM

## 2025-06-11 RX ORDER — IBUPROFEN 200 MG
16 TABLET ORAL
Status: DISCONTINUED | OUTPATIENT
Start: 2025-06-11 | End: 2025-06-14 | Stop reason: HOSPADM

## 2025-06-11 RX ORDER — QUETIAPINE FUMARATE 100 MG/1
200 TABLET, FILM COATED ORAL NIGHTLY
Status: DISCONTINUED | OUTPATIENT
Start: 2025-06-11 | End: 2025-06-14 | Stop reason: HOSPADM

## 2025-06-11 RX ORDER — GLUCAGON 1 MG
1 KIT INJECTION
Status: DISCONTINUED | OUTPATIENT
Start: 2025-06-11 | End: 2025-06-14 | Stop reason: HOSPADM

## 2025-06-11 RX ORDER — LABETALOL HYDROCHLORIDE 5 MG/ML
20 INJECTION, SOLUTION INTRAVENOUS EVERY 4 HOURS PRN
Status: DISCONTINUED | OUTPATIENT
Start: 2025-06-11 | End: 2025-06-14 | Stop reason: HOSPADM

## 2025-06-11 RX ORDER — DOXYCYCLINE HYCLATE 100 MG/1
100 TABLET, DELAYED RELEASE ORAL 2 TIMES DAILY
COMMUNITY

## 2025-06-11 RX ORDER — ONDANSETRON HYDROCHLORIDE 2 MG/ML
4 INJECTION, SOLUTION INTRAVENOUS EVERY 6 HOURS PRN
Status: DISCONTINUED | OUTPATIENT
Start: 2025-06-11 | End: 2025-06-14 | Stop reason: HOSPADM

## 2025-06-11 RX ORDER — SODIUM CHLORIDE 0.9 % (FLUSH) 0.9 %
10 SYRINGE (ML) INJECTION EVERY 12 HOURS PRN
Status: DISCONTINUED | OUTPATIENT
Start: 2025-06-11 | End: 2025-06-14 | Stop reason: HOSPADM

## 2025-06-11 RX ORDER — ACETAMINOPHEN 325 MG/1
650 TABLET ORAL EVERY 4 HOURS PRN
Status: DISCONTINUED | OUTPATIENT
Start: 2025-06-11 | End: 2025-06-14 | Stop reason: HOSPADM

## 2025-06-11 RX ORDER — METOPROLOL TARTRATE 25 MG/1
25 TABLET, FILM COATED ORAL 2 TIMES DAILY
Status: DISCONTINUED | OUTPATIENT
Start: 2025-06-11 | End: 2025-06-14 | Stop reason: HOSPADM

## 2025-06-11 RX ORDER — PANTOPRAZOLE SODIUM 40 MG/1
40 TABLET, DELAYED RELEASE ORAL DAILY
COMMUNITY

## 2025-06-11 RX ADMIN — METOPROLOL TARTRATE 25 MG: 25 TABLET, FILM COATED ORAL at 08:06

## 2025-06-11 RX ADMIN — QUETIAPINE FUMARATE 200 MG: 100 TABLET ORAL at 08:06

## 2025-06-11 NOTE — H&P
Hospital Medicine  History & Physical Examination       Patient: Alisa Maradiaga  MRN: [id number]  STATUS: IP- Inpatient   DOS: 6/11/2025   PCP: Dave Mcleod MD      CC: dizziness       HISTORY OF PRESENT ILLNESS       70 y.o. female with a history that includes HTN, presented to ED with dizziness.  She notes she was at the grocery store and was looking down at the meats in the deli when  vision became blurry and she felt lightheaded and disoriented.  She reports the symptoms lasted 10-15 minutes.  For the most part it has resolved but she states that her head still feels weird.    Evaluation in ED on grossly normal vital signs except for accelerated blood pressures up to 188/98.  Blood counts and chemistries are unremarkable, troponin was undetectable.  Glucose was noted to be 210, without prior diagnosis of diabetes, UA was negative for glucose.  Chest x-ray and CT of the head both without acute abnormalities.      REVIEW OF SYSTEMS     Except as documented, all other systems reviewed and negative       PAST MEDICAL HISTORY     Hypertension   Hyperlipidemia   Lumbar spinal stenosis   Chronic back pain  Depression   Insomnia       PAST SURGICAL HISTORY     Breast biopsy, bilateral    Left Carotid endarterectomy    Cervical fusion    Hysterectomy    Lumbar spine surgery    Robotic lumbar spinal fusion, PLIF 8/2/2023   Tubal ligation        FAMILY HISTORY     Reviewed, negative in relation to patient's current condition.      SOCIAL HISTORY     Denies alcohol, tobacco or drug abuse  Screening for Social Drivers of health:  Patient Screened for food insecurity, housing instability, transportation needs, utility difficulties, and interpersonal safety. Case management to address any needs.       ALLERGIES     NKDA      HOME MEDICATIONS      atorvastatin (LIPITOR) 20 MG tablet 1 tablet oral daily    cholecalciferol, vitamin D3, (VITAMIN D3) 50 mcg (2,000 unit) Tab 1 tablet, Daily    EScitalopram oxalate (LEXAPRO) 20  mg, Oral, Daily    hydroCHLOROthiazide 12.5 mg, Oral, Daily    lisinopriL (PRINIVIL,ZESTRIL) 20 mg, Oral, Daily    metoprolol tartrate (LOPRESSOR) 25 mg, Oral, 2 times daily    QUEtiapine (SEROQUEL) 200 mg, Oral, Nightly       PHYSICAL EXAM     VITALS: T 98.1 °F (36.7 °C)   BP (!) 188/98   P 67   RR 14   O2 95 %    GENERAL: Awake and in NAD  HEENT: NC/AT, EOMI, PERRL.  NECK: Supple,  No JVD  LUNGS: CTA B/L  CVS: RRR, S1S2 positive  GI/: Soft, NT/ND, bowel sounds positive.  EXTREMITIES: Peripheral pulses 2+, no peripheral edema  DERM: Warm, dry.  No rashes or lesions noted.  NEURO: AAOx3, no focal neurologic deficit  PSYCH: Cooperative, appropriate mood and affect       DIAGNOSTICS     Recent Labs     06/11/25  1427   WBC 6.96   RBC 4.83   HGB 15.4   HCT 46.9   MCV 97.1*   MCH 31.9*   MCHC 32.8*   RDW 12.9           Recent Labs     06/11/25  1427      K 4.3   CO2 27   BUN 12.2   CREATININE 0.84   CALCIUM 9.1   MG 1.90   ALBUMIN 3.9   GLOBULIN 3.3   ALKPHOS 65   ALT 60*   AST 46*   BILITOT 0.5     Recent Labs     06/11/25  1427   TROPONINI <0.010        X-Ray Chest PA And Lateral  Narrative: The heart is normal in size.  The lungs are clear.  There is no pleural effusion or visible pneumothorax.  Impression:   No acute abnormality of the chest.  Electronically signed by: Rubi Peng  Date:    06/11/2025  Time:    14:27    CT Head Without Contrast  Narrative:   INTRACRANIAL: Mild generalized brain atrophy.  Mild chronic small-vessel ischemic changes of the supratentorial periventricular white matter.  No acute intracranial hemorrhage.  No hydrocephalus.  No intracranial mass effect.  SINUSES: Visualized paranasal sinuses and mastoids are clear.  SKULL/SCALP: Visualized osseous structures are normal.  ORBITS: Bilateral lens replacements.  Impression:   No acute intracranial pathology.  Mild generalized brain atrophy.  Mild chronic small vessel ischemic changes.  Electronically signed by: Timoteo  David  Date:    06/11/2025  Time:    14:24      ASSESSMENT     HTN Urgency  h/o spinal stenosis, s/p multiple surgeries      PLAN     Will admit for observation, and monitor on telemetry  Can trend troponin, check Echo  Resume home antihypertensive and monitor pressures  In the interim can utilize IV PRNs  Home medications reviewed and resumed as deemed appropriate        Prophylaxis: SCDs  Code Status: Full      Paolo Ellis MD  Hospital Medicine            If the patient has been admitted under observation status, it is at my discretion and under our care with hospital medicine services. [TWA]

## 2025-06-11 NOTE — FIRST PROVIDER EVALUATION
Medical screening examination initiated.  I have conducted a focused provider triage encounter, findings are as follows:    Brief history of present illness:  70 year old female presents to ER with c/o weakness and dizziness that started while in [employer]. States that she felt disoriented. Denies cp or SOB    Vitals:    06/11/25 1349   BP: (!) 179/89   BP Location: Left arm   Pulse: 70   Resp: 18   Temp: 98.1 °F (36.7 °C)   TempSrc: Oral   SpO2: 100%   Weight: 63.5 kg (140 lb)   Height: 5' (1.524 m)       Pertinent physical exam:  awake and alert, nad    Brief workup plan:  labs, EKG, cxr     Preliminary workup initiated; this workup will be continued and followed by the physician or advanced practice provider that is assigned to the patient when roomed.

## 2025-06-11 NOTE — ED PROVIDER NOTES
Encounter Date: 6/11/2025       History     Chief Complaint   Patient presents with    Dizziness     Pt arrives POV and ambulatory in triage. Reports at 12:30p she experienced generaized weakness, dizziness and disorientation while shopping at [employer]. Denies C/P SOB.Patient is VAN(-), (-)BT,  in triage, GCS 15. No deficits noted in triage. Pt has hx of HTN and is compliant with meds. Currently on antibiotics for H. Pylori.     70-year-old female with history of hypertension and hyperlipidemia presenting for an episode of dizziness and disorientation.  She reports she was shopping at [employer] and was at the meat counter around 12:30 p.m. when her vision suddenly got blurred.  She reports she had a hard time focusing and was unable to find her way to the cash register.  She had to get help to get out of the store.  She reports the symptoms lasted 10-15 minutes.  For the most part it has resolved but she states that her head still feels weird.    The history is provided by the patient.     Review of patient's allergies indicates:  No Known Allergies  Past Medical History:   Diagnosis Date    Arthritis     Chronic back pain     Depression     Hyperlipidemia     Hypertension     Insomnia     Spinal stenosis, lumbar region, with neurogenic claudication      Past Surgical History:   Procedure Laterality Date    BREAST BIOPSY Bilateral     CAROTID ENDARTERECTOMY Left     CERVICAL FUSION      HYSTERECTOMY      LUMBAR SPINE SURGERY      x3    ROBOTIC FUSION, SPINE, LUMBAR, PLIF N/A 8/2/2023    Procedure: ROBOTIC FUSION,SPINE,LUMBAR,PLIF;  Surgeon: Bailey Prather MD;  Location: [facility];  Service: Neurosurgery;  Laterality: N/A;  OPEN L3-L5 FUSION / LAMINECTOMY // DRILL // SCOPE // ROBOT  //  PRONE // PRONE DULCE //  DIRECT SPINE // NDM    TUBAL LIGATION       No family history on file.  Social History[1]  Review of Systems   Eyes:  Positive for visual disturbance.   Neurological:  Positive for dizziness and weakness.        Physical Exam     Initial Vitals [06/11/25 1349]   BP Pulse Resp Temp SpO2   (!) 179/89 70 18 98.1 °F (36.7 °C) 100 %      MAP       --         Physical Exam    Nursing note and vitals reviewed.  Constitutional: She appears well-developed and well-nourished. She is not diaphoretic. She does not appear ill. No distress.   HENT:   Head: Normocephalic and atraumatic.   Right Ear: External ear normal.   Left Ear: External ear normal. Mouth/Throat: Oropharynx is clear and moist.   Eyes: Conjunctivae are normal.   Neck: Neck supple. No tracheal deviation present.   Cardiovascular:  Normal rate, regular rhythm and normal heart sounds.           No murmur heard.  Pulmonary/Chest: Breath sounds normal. No respiratory distress. She has no wheezes. She has no rhonchi. She has no rales.   Abdominal: Abdomen is soft. She exhibits no distension. There is no abdominal tenderness.   No right CVA tenderness.  No left CVA tenderness.   Musculoskeletal:         General: Normal range of motion.      Cervical back: Neck supple.     Neurological: She is alert and oriented to person, place, and time. She has normal strength. No cranial nerve deficit or sensory deficit. GCS score is 15. GCS eye subscore is 4. GCS verbal subscore is 5. GCS motor subscore is 6.   Skin: Skin is warm and dry. Capillary refill takes less than 2 seconds. No rash noted. No pallor.         ED Course   Procedures  Labs Reviewed   COMPREHENSIVE METABOLIC PANEL - Abnormal       Result Value    Sodium 139      Potassium 4.3      Chloride 103      CO2 27      Glucose 164 (*)     Blood Urea Nitrogen 12.2      Creatinine 0.84      Calcium 9.1      Protein Total 7.2      Albumin 3.9      Globulin 3.3      Albumin/Globulin Ratio 1.2      Bilirubin Total 0.5      ALP 65      ALT 60 (*)     AST 46 (*)     eGFR >60      Anion Gap 9.0      BUN/Creatinine Ratio 15     URINALYSIS, REFLEX TO URINE CULTURE - Abnormal    Color, UA Colorless      Appearance, UA Clear       Specific Gravity, UA 1.004 (*)     pH, UA 6.5      Protein, UA Negative      Glucose, UA Normal      Ketones, UA Negative      Blood, UA Negative      Bilirubin, UA Negative      Urobilinogen, UA Normal      Nitrites, UA Negative      Leukocyte Esterase, UA Negative      RBC, UA 0-5      WBC, UA 0-5      Bacteria, UA None Seen      Squamous Epithelial Cells, UA None Seen     CBC WITH DIFFERENTIAL - Abnormal    WBC 6.96      RBC 4.83      Hgb 15.4      Hct 46.9      MCV 97.1 (*)     MCH 31.9 (*)     MCHC 32.8 (*)     RDW 12.9      Platelet 259      MPV 9.7      Neut % 64.9      Lymph % 24.6      Mono % 8.3      Eos % 1.1      Basophil % 0.7      Imm Grans % 0.4      Neut # 4.51      Lymph # 1.71      Mono # 0.58      Eos # 0.08      Baso # 0.05      Imm Gran # 0.03      NRBC% 0.0     POCT GLUCOSE - Abnormal    POCT Glucose 210 (*)    MAGNESIUM - Normal    Magnesium Level 1.90     TROPONIN I - Normal    Troponin-I <0.010     CBC W/ AUTO DIFFERENTIAL    Narrative:     The following orders were created for panel order CBC auto differential.  Procedure                               Abnormality         Status                     ---------                               -----------         ------                     CBC with Differential[[id number]]       Abnormal            Final result                 Please view results for these tests on the individual orders.     EKG Readings: (Independently Interpreted)   Initial Reading: No STEMI. Rhythm: Normal Sinus Rhythm. Heart Rate: 70. Ectopy: No Ectopy. Axis: Normal. Clinical Impression: Left Ventricular Hypertrophy (LVH)   Performed at 1348     ECG Results              EKG 12-lead (Final result)        Collection Time Result Time QRS Duration OHS QTC Calculation    06/11/25 13:48:48 06/11/25 15:32:22 88 457                     Final result by Interface, Lab In [facility] (06/11/25 15:32:28)                   Narrative:    Test Reason : R53.1,    Vent. Rate :  70 BPM     Atrial  Rate :  70 BPM     P-R Int : 168 ms          QRS Dur :  88 ms      QT Int : 424 ms       P-R-T Axes :  46  -5   4 degrees    QTcB Int : 457 ms    Normal sinus rhythm  Minimal voltage criteria for LVH, may be normal variant ( R in aVL )  Borderline Abnormal ECG  No previous ECGs available  Confirmed by Adriel Hoang ([id number]) on 6/11/2025 3:32:17 PM    Referred By:            Confirmed By: Adriel Hoang                                  Imaging Results              CT Head Without Contrast (Final result)  Result time 06/11/25 14:24:38      Final result by Timoteo Hernandez MD (06/11/25 14:24:38)                   Impression:      No acute intracranial pathology.    Mild generalized brain atrophy.    Mild chronic small vessel ischemic changes.      Electronically signed by: Timoteo Hernandez  Date:    06/11/2025  Time:    14:24               Narrative:    EXAMINATION:  CT HEAD WITHOUT CONTRAST    CLINICAL HISTORY:  Dizziness, persistent/recurrent, cardiac or vascular cause suspected;    TECHNIQUE:  Low dose axial images were obtained through the head.  Coronal and sagittal reformations were also performed. Contrast was not administered.  Dose reduction techniques including automatic exposure control (AEC) were utilized.    Dose (DLP): 876 mGycm    COMPARISON:  None.    FINDINGS:  INTRACRANIAL: Mild generalized brain atrophy.  Mild chronic small-vessel ischemic changes of the supratentorial periventricular white matter.  No acute intracranial hemorrhage.  No hydrocephalus.  No intracranial mass effect.    SINUSES: Visualized paranasal sinuses and mastoids are clear.    SKULL/SCALP: Visualized osseous structures are normal.    ORBITS: Bilateral lens replacements.                                       X-Ray Chest PA And Lateral (Final result)  Result time 06/11/25 14:27:35      Final result by Rubi Peng MD (06/11/25 14:27:35)                   Impression:      No acute abnormality of the chest.      Electronically  signed by: Rubi Peng  Date:    06/11/2025  Time:    14:27               Narrative:    EXAMINATION:  XR CHEST PA AND LATERAL    CLINICAL HISTORY:  Weakness    TECHNIQUE:  PA and lateral chest radiographs    COMPARISON:  Chest x-ray dated 07/26/2023    FINDINGS:  The heart is normal in size.  The lungs are clear.  There is no pleural effusion or visible pneumothorax.                                       Medications   sodium chloride 0.9% flush 10 mL (has no administration in time range)   ondansetron injection 4 mg (has no administration in time range)   acetaminophen tablet 650 mg (has no administration in time range)   glucose chewable tablet 16 g (has no administration in time range)   glucose chewable tablet 24 g (has no administration in time range)   dextrose 50% injection 12.5 g (has no administration in time range)   dextrose 50% injection 25 g (has no administration in time range)   glucagon (human recombinant) injection 1 mg (has no administration in time range)   hydroCHLOROthiazide tablet 12.5 mg (has no administration in time range)   lisinopriL tablet 20 mg (has no administration in time range)   metoprolol tartrate (LOPRESSOR) tablet 25 mg (has no administration in time range)   QUEtiapine tablet 200 mg (has no administration in time range)   EScitalopram oxalate tablet 20 mg (has no administration in time range)   labetaloL injection 20 mg (has no administration in time range)   hydrALAZINE injection 10 mg (has no administration in time range)   meclizine tablet 25 mg (has no administration in time range)     Medical Decision Making  DDX includes but not limited to TIA vs stroke, hypoglycemia, hypertensive emergency, syncope, near syncope, dysrhythmia, electrolyte abnormality, brain tumor, seizure, migraine       Labs unremarkable  CT head with chronic microvascular changes  Will admit for TIA workup     Problems Addressed:  Disorientation: acute illness or injury that poses a threat to life or  bodily functions  Dizziness: acute illness or injury that poses a threat to life or bodily functions  TIA (transient ischemic attack): acute illness or injury that poses a threat to life or bodily functions    Amount and/or Complexity of Data Reviewed  Labs: ordered. Decision-making details documented in ED Course.  Radiology: ordered. Decision-making details documented in ED Course.  ECG/medicine tests: ordered and independent interpretation performed. Decision-making details documented in ED Course.    Risk  Prescription drug management.  Decision regarding hospitalization.                                      Clinical Impression:  Final diagnoses:  [R53.1] Generalized weakness  [R42] Dizziness  [G45.9] TIA (transient ischemic attack) (Primary)  [R41.0] Disorientation          ED Disposition Condition    Admit                       [1]   Social History  Tobacco Use    Smoking status: Never    Smokeless tobacco: Never   Substance Use Topics    Alcohol use: Yes     Comment: socially    Drug use: Never        Megan Xie MD  06/11/25 0171

## 2025-06-12 LAB
ANION GAP SERPL CALC-SCNC: 7 MEQ/L
APICAL FOUR CHAMBER EJECTION FRACTION: 56 %
APICAL TWO CHAMBER EJECTION FRACTION: 54 %
AV INDEX (PROSTH): 0.65
AV MEAN GRADIENT: 5 MMHG
AV PEAK GRADIENT: 10 MMHG
AV VALVE AREA BY VELOCITY RATIO: 1.7 CM²
AV VALVE AREA: 1.7 CM²
AV VELOCITY RATIO: 0.69
BSA FOR ECHO PROCEDURE: 1.68 M2
BUN SERPL-MCNC: 13.2 MG/DL (ref 9.8–20.1)
CALCIUM SERPL-MCNC: 9.2 MG/DL (ref 8.4–10.2)
CHLORIDE SERPL-SCNC: 109 MMOL/L (ref 98–107)
CO2 SERPL-SCNC: 26 MMOL/L (ref 23–31)
CREAT SERPL-MCNC: 0.79 MG/DL (ref 0.55–1.02)
CREAT/UREA NIT SERPL: 17
CV ECHO LV RWT: 0.48 CM
DOP CALC AO PEAK VEL: 1.6 M/S
DOP CALC AO VTI: 32.6 CM
DOP CALC LVOT AREA: 2.5 CM2
DOP CALC LVOT DIAMETER: 1.8 CM
DOP CALC LVOT PEAK VEL: 1.1 M/S
DOP CALC LVOT STROKE VOLUME: 53.9 CM3
DOP CALC MV VTI: 31 CM
DOP CALCLVOT PEAK VEL VTI: 21.2 CM
E WAVE DECELERATION TIME: 227 MSEC
E/A RATIO: 0.73
E/E' RATIO: 11 M/S
ECHO LV POSTERIOR WALL: 1 CM (ref 0.6–1.1)
FRACTIONAL SHORTENING: 40.5 % (ref 28–44)
GFR SERPLBLD CREATININE-BSD FMLA CKD-EPI: >60 ML/MIN/1.73/M2
GLUCOSE SERPL-MCNC: 113 MG/DL (ref 82–115)
INTERVENTRICULAR SEPTUM: 0.9 CM (ref 0.6–1.1)
LEFT ATRIUM AREA SYSTOLIC (APICAL 2 CHAMBER): 15.1 CM2
LEFT ATRIUM AREA SYSTOLIC (APICAL 4 CHAMBER): 13 CM2
LEFT ATRIUM SIZE: 3.2 CM
LEFT ATRIUM VOLUME INDEX MOD: 21 ML/M2
LEFT ATRIUM VOLUME MOD: 34 ML
LEFT INTERNAL DIMENSION IN SYSTOLE: 2.5 CM (ref 2.1–4)
LEFT VENTRICLE DIASTOLIC VOLUME INDEX: 48.17 ML/M2
LEFT VENTRICLE DIASTOLIC VOLUME: 79 ML
LEFT VENTRICLE END DIASTOLIC VOLUME APICAL 2 CHAMBER: 58.6 ML
LEFT VENTRICLE END DIASTOLIC VOLUME APICAL 4 CHAMBER: 53 ML
LEFT VENTRICLE END SYSTOLIC VOLUME APICAL 2 CHAMBER: 39.1 ML
LEFT VENTRICLE END SYSTOLIC VOLUME APICAL 4 CHAMBER: 28.9 ML
LEFT VENTRICLE MASS INDEX: 77.9 G/M2
LEFT VENTRICLE SYSTOLIC VOLUME INDEX: 13.4 ML/M2
LEFT VENTRICLE SYSTOLIC VOLUME: 22 ML
LEFT VENTRICULAR INTERNAL DIMENSION IN DIASTOLE: 4.2 CM (ref 3.5–6)
LEFT VENTRICULAR MASS: 127.8 G
LV LATERAL E/E' RATIO: 8.6 M/S
LV SEPTAL E/E' RATIO: 15.4 M/S
LVED V (TEICH): 78.6 ML
LVES V (TEICH): 22.3 ML
LVOT MG: 2 MMHG
LVOT MV: 0.69 CM/S
MV MEAN GRADIENT: 3 MMHG
MV PEAK A VEL: 1.06 M/S
MV PEAK E VEL: 0.77 M/S
MV PEAK GRADIENT: 6 MMHG
MV VALVE AREA BY CONTINUITY EQUATION: 1.74 CM2
OHS CV RV/LV RATIO: 0.76 CM
OHS LV EJECTION FRACTION SIMPSONS BIPLANE MOD: 55 %
POTASSIUM SERPL-SCNC: 3.9 MMOL/L (ref 3.5–5.1)
RA WIDTH: 2.6 CM
RIGHT VENTRICLE DIASTOLIC BASEL DIMENSION: 3.2 CM
RIGHT VENTRICULAR END-DIASTOLIC DIMENSION: 3.2 CM
SODIUM SERPL-SCNC: 142 MMOL/L (ref 136–145)
TDI LATERAL: 0.09 M/S
TDI SEPTAL: 0.05 M/S
TDI: 0.07 M/S
TRICUSPID ANNULAR PLANE SYSTOLIC EXCURSION: 1.8 CM
TROPONIN I SERPL-MCNC: <0.01 NG/ML (ref 0–0.04)
TROPONIN I SERPL-MCNC: <0.01 NG/ML (ref 0–0.04)
Z-SCORE OF LEFT VENTRICULAR DIMENSION IN END DIASTOLE: -0.94
Z-SCORE OF LEFT VENTRICULAR DIMENSION IN END SYSTOLE: -1.07

## 2025-06-12 PROCEDURE — 21400001 HC TELEMETRY ROOM

## 2025-06-12 PROCEDURE — 25000003 PHARM REV CODE 250: Performed by: INTERNAL MEDICINE

## 2025-06-12 PROCEDURE — 84484 ASSAY OF TROPONIN QUANT: CPT | Performed by: INTERNAL MEDICINE

## 2025-06-12 PROCEDURE — 80048 BASIC METABOLIC PNL TOTAL CA: CPT | Performed by: INTERNAL MEDICINE

## 2025-06-12 PROCEDURE — 36415 COLL VENOUS BLD VENIPUNCTURE: CPT | Performed by: INTERNAL MEDICINE

## 2025-06-12 RX ORDER — DOXYCYCLINE HYCLATE 100 MG
100 TABLET ORAL EVERY 12 HOURS
Status: DISCONTINUED | OUTPATIENT
Start: 2025-06-12 | End: 2025-06-14 | Stop reason: HOSPADM

## 2025-06-12 RX ORDER — PANTOPRAZOLE SODIUM 40 MG/1
40 TABLET, DELAYED RELEASE ORAL DAILY
Status: DISCONTINUED | OUTPATIENT
Start: 2025-06-12 | End: 2025-06-14 | Stop reason: HOSPADM

## 2025-06-12 RX ORDER — ASPIRIN 81 MG/1
81 TABLET ORAL DAILY
Status: DISCONTINUED | OUTPATIENT
Start: 2025-06-12 | End: 2025-06-14 | Stop reason: HOSPADM

## 2025-06-12 RX ORDER — METRONIDAZOLE 500 MG/1
500 TABLET ORAL EVERY 8 HOURS
Status: DISCONTINUED | OUTPATIENT
Start: 2025-06-12 | End: 2025-06-14 | Stop reason: HOSPADM

## 2025-06-12 RX ADMIN — DOXYCYCLINE HYCLATE 100 MG: 100 TABLET, COATED ORAL at 09:06

## 2025-06-12 RX ADMIN — HYDROCHLOROTHIAZIDE 12.5 MG: 12.5 TABLET ORAL at 05:06

## 2025-06-12 RX ADMIN — QUETIAPINE FUMARATE 200 MG: 100 TABLET ORAL at 09:06

## 2025-06-12 RX ADMIN — ASPIRIN 81 MG: 81 TABLET, DELAYED RELEASE ORAL at 05:06

## 2025-06-12 RX ADMIN — METRONIDAZOLE 500 MG: 500 TABLET ORAL at 09:06

## 2025-06-12 RX ADMIN — LISINOPRIL 20 MG: 20 TABLET ORAL at 05:06

## 2025-06-12 RX ADMIN — PANTOPRAZOLE SODIUM 40 MG: 40 TABLET, DELAYED RELEASE ORAL at 05:06

## 2025-06-12 RX ADMIN — ESCITALOPRAM OXALATE 20 MG: 10 TABLET ORAL at 09:06

## 2025-06-12 RX ADMIN — METOPROLOL TARTRATE 25 MG: 25 TABLET, FILM COATED ORAL at 09:06

## 2025-06-12 RX ADMIN — METRONIDAZOLE 500 MG: 500 TABLET ORAL at 05:06

## 2025-06-12 NOTE — PLAN OF CARE
"   06/12/25 1137   Discharge Assessment   Assessment Type Discharge Planning Assessment   Confirmed/corrected address, phone number and insurance Yes   Confirmed Demographics Correct on Facesheet   Source of Information family;patient   Communicated BABATUNDE with patient/caregiver Yes   People in Home spouse   Name(s) of People in Home Carline Maradiaga" (Spouse)  [phone]   Do you expect to return to your current living situation? Yes   Do you have help at home or someone to help you manage your care at home? Yes   Who are your caregiver(s) and their phone number(s)? Carline Maradiaga" (Spouse)  [phone]   Current cognitive status: Alert/Oriented   Walking or Climbing Stairs Difficulty no   Dressing/Bathing Difficulty no   Home Accessibility wheelchair accessible   Equipment Currently Used at Home blood pressure machine   Readmission within 30 days? No   Patient currently being followed by outpatient case management? No   Do you currently have service(s) that help you manage your care at home? No   Do you take prescription medications? Yes   Do you have prescription coverage? Yes   Do you have any problems affording any of your prescribed medications? No   Is the patient taking medications as prescribed? yes   Who is going to help you get home at discharge? Carline Maradiaga" (Spouse)  [phone]   How do you get to doctors appointments? car, drives self   Are you on dialysis? No   Do you take coumadin? No   Discharge Plan A Home with family   Discharge Plan B Home with family   DME Needed Upon Discharge  none   Discharge Plan discussed with: Patient;Spouse/sig other   Name(s) and Number(s) Carline Maradiaga" (Spouse)  [phone]   Transition of Care Barriers None   Physical Activity   On average, how many days per week do you engage in moderate to strenuous exercise (like a brisk walk)? 0 days   On average, how many minutes do you engage in exercise at this level? 0 min   Financial Resource Strain   How " hard is it for you to pay for the very basics like food, housing, medical care, and heating? Not hard   Housing Stability   In the last 12 months, was there a time when you were not able to pay the mortgage or rent on time? N   At any time in the past 12 months, were you homeless or living in a shelter (including now)? N   Transportation Needs   In the past 12 months, has lack of transportation kept you from medical appointments or from getting medications? no   In the past 12 months, has lack of transportation kept you from meetings, work, or from getting things needed for daily living? No   Food Insecurity   Within the past 12 months, you worried that your food would run out before you got the money to buy more. Never true   Within the past 12 months, the food you bought just didn't last and you didn't have money to get more. Never true   Stress   Do you feel stress - tense, restless, nervous, or anxious, or unable to sleep at night because your mind is troubled all the time - these days? Not at all   Social Isolation   How often do you feel lonely or isolated from those around you?  Never   Alcohol Use   Q1: How often do you have a drink containing alcohol? Monthly or l   Utilities   In the past 12 months has the electric, gas, oil, or water company threatened to shut off services in your home? No   Health Literacy   How often do you need to have someone help you when you read instructions, pamphlets, or other written material from your doctor or pharmacy? Sometimes

## 2025-06-12 NOTE — PROGRESS NOTES
"[facility] Hospital Medicine Progress Note        Chief Complaint: Inpatient Follow-up     HPI:    "70 y.o. female with a history that includes HTN, presented to ED with dizziness.  She notes she was at the grocery store and was looking down at the meats in the deli when  vision became blurry and she felt lightheaded and disoriented.  She reports the symptoms lasted 10-15 minutes.  For the most part it has resolved but she states that her head still feels weird.    Evaluation in ED on grossly normal vital signs except for accelerated blood pressures up to 188/98.  Blood counts and chemistries are unremarkable, troponin was undetectable.  Glucose was noted to be 210, without prior diagnosis of diabetes, UA was negative for glucose.  Chest x-ray and CT of the head both without acute abnormalities."     Interval Hx:   No acute events reported overnight.  Patient was seen at bedside this morning, family member present during my interview she reports doing okay denied any CP SOB dizziness, discussed possible etiologies for confusion, discussed plan of care   She reported taking ASA 81 at home, she was previously monitored with heart monitor by her cardiologist no events were noted     Hemodynamically stable, blood pressure has improved     Case was discussed with patient's nurse     Objective/physical exam:  General: In no acute distress, afebrile  Chest: Clear to auscultation bilaterally  Heart: RRR, +S1, S2  Abdomen: Soft, nontender  MSK: Warm, no lower extremity edema  Neurologic: Alert and oriented , grossly nonfocal  VITAL SIGNS: 24 HRS MIN & MAX LAST   Temp  Min: 97.8 °F (36.6 °C)  Max: 98.4 °F (36.9 °C) 98.1 °F (36.7 °C)   BP  Min: 117/64  Max: 188/98 138/69   Pulse  Min: 64  Max: 75  75   Resp  Min: 14  Max: 21 18   SpO2  Min: 92 %  Max: 100 % (!) 92 %     I have reviewed the following labs:  Recent Labs   Lab 06/11/25  1427   WBC 6.96   RBC 4.83   HGB 15.4   HCT 46.9   MCV 97.1* "   MCH 31.9*   MCHC 32.8*   RDW 12.9      MPV 9.7     Recent Labs   Lab 06/11/25  1427 06/12/25  0430    142   K 4.3 3.9    109*   CO2 27 26   BUN 12.2 13.2   CREATININE 0.84 0.79   * 113   CALCIUM 9.1 9.2   MG 1.90  --    ALBUMIN 3.9  --    PROT 7.2  --    ALKPHOS 65  --    ALT 60*  --    AST 46*  --    BILITOT 0.5  --      Microbiology Results (last 7 days)       ** No results found for the last 168 hours. **             See below for Radiology    Assessment/Plan:  Generalized Weakness, confusion ,dizziness  HTN Urgency-improving  h/o spinal stenosis, s/p multiple surgeries    Monitor on tele  Check orthostatic vitals   Follow echo  Monitor BP, titrate antihypertensives  Add ASA  Resume home medications ppi, doxycycline, Flagyl for active treatment of H pylori  Supportive care   Otherwise continue current care and monitoring    VTE prophylaxis:  SCDs      Anticipated discharge and Disposition:   Likely in next 24 hours if stable          Portions of this note dictated using EMR integrated voice recognition software, and may be subject to voice recognition errors not corrected at proofreading. Please contact writer for clarification if needed.   _____________________________________________________________________    Malnutrition Status:  Nutrition consulted. Most recent weight and BMI monitored-     Measurements:  Wt Readings from Last 1 Encounters:   06/11/25 66.9 kg (147 lb 7.8 oz)   Body mass index is 28.8 kg/m².    Patient has been screened and assessed by RD.    Malnutrition Type:  Context:    Level:      Malnutrition Characteristic Summary:       Interventions/Recommendations (treatment strategy):        Scheduled Med:   EScitalopram oxalate  20 mg Oral Daily    hydroCHLOROthiazide  12.5 mg Oral Daily    lisinopriL  20 mg Oral Daily    metoprolol tartrate  25 mg Oral BID    QUEtiapine  200 mg Oral QHS      Continuous Infusions:     PRN Meds:    Current Facility-Administered  Medications:     acetaminophen, 650 mg, Oral, Q4H PRN    dextrose 50%, 12.5 g, Intravenous, PRN    dextrose 50%, 25 g, Intravenous, PRN    glucagon (human recombinant), 1 mg, Intramuscular, PRN    glucose, 16 g, Oral, PRN    glucose, 24 g, Oral, PRN    hydrALAZINE, 10 mg, Intravenous, Q4H PRN    labetalol, 20 mg, Intravenous, Q4H PRN    meclizine, 25 mg, Oral, TID PRN    ondansetron, 4 mg, Intravenous, Q6H PRN    sodium chloride 0.9%, 10 mL, Intravenous, Q12H PRN     Radiology:  I have personally reviewed the following imaging and agree with the radiologist.     X-Ray Chest PA And Lateral  Narrative: EXAMINATION:  XR CHEST PA AND LATERAL    CLINICAL HISTORY:  Weakness    TECHNIQUE:  PA and lateral chest radiographs    COMPARISON:  Chest x-ray dated 07/26/2023    FINDINGS:  The heart is normal in size.  The lungs are clear.  There is no pleural effusion or visible pneumothorax.  Impression: No acute abnormality of the chest.    Electronically signed by: Rubi Peng  Date:    06/11/2025  Time:    14:27  CT Head Without Contrast  Narrative: EXAMINATION:  CT HEAD WITHOUT CONTRAST    CLINICAL HISTORY:  Dizziness, persistent/recurrent, cardiac or vascular cause suspected;    TECHNIQUE:  Low dose axial images were obtained through the head.  Coronal and sagittal reformations were also performed. Contrast was not administered.  Dose reduction techniques including automatic exposure control (AEC) were utilized.    Dose (DLP): 876 mGycm    COMPARISON:  None.    FINDINGS:  INTRACRANIAL: Mild generalized brain atrophy.  Mild chronic small-vessel ischemic changes of the supratentorial periventricular white matter.  No acute intracranial hemorrhage.  No hydrocephalus.  No intracranial mass effect.    SINUSES: Visualized paranasal sinuses and mastoids are clear.    SKULL/SCALP: Visualized osseous structures are normal.    ORBITS: Bilateral lens replacements.  Impression: No acute intracranial pathology.    Mild generalized  brain atrophy.    Mild chronic small vessel ischemic changes.    Electronically signed by: Timoteo Hernandez  Date:    06/11/2025  Time:    14:24      Carloz Miramontes MD  Department of Hospital Medicine   [facility]   06/12/2025

## 2025-06-13 PROBLEM — R42 EPISODE OF DIZZINESS: Status: ACTIVE | Noted: 2025-06-13

## 2025-06-13 LAB
BSA FOR ECHO PROCEDURE: 1.68 M2
CHOLEST SERPL-MCNC: 100 MG/DL
CHOLEST/HDLC SERPL: 4 {RATIO} (ref 0–5)
EST. AVERAGE GLUCOSE BLD GHB EST-MCNC: 116.9 MG/DL
HBA1C MFR BLD: 5.7 %
HDLC SERPL-MCNC: 23 MG/DL (ref 35–60)
LDLC SERPL CALC-MCNC: 43 MG/DL (ref 50–140)
TRIGL SERPL-MCNC: 171 MG/DL (ref 37–140)
TSH SERPL-ACNC: 3.12 UIU/ML (ref 0.35–4.94)
VLDLC SERPL CALC-MCNC: 34 MG/DL

## 2025-06-13 PROCEDURE — 80061 LIPID PANEL: CPT | Performed by: NURSE PRACTITIONER

## 2025-06-13 PROCEDURE — 83036 HEMOGLOBIN GLYCOSYLATED A1C: CPT | Performed by: NURSE PRACTITIONER

## 2025-06-13 PROCEDURE — 21400001 HC TELEMETRY ROOM

## 2025-06-13 PROCEDURE — 63600175 PHARM REV CODE 636 W HCPCS: Performed by: NURSE PRACTITIONER

## 2025-06-13 PROCEDURE — 25000003 PHARM REV CODE 250: Performed by: INTERNAL MEDICINE

## 2025-06-13 PROCEDURE — 25500020 PHARM REV CODE 255: Performed by: INTERNAL MEDICINE

## 2025-06-13 PROCEDURE — 92523 SPEECH SOUND LANG COMPREHEN: CPT

## 2025-06-13 PROCEDURE — 36415 COLL VENOUS BLD VENIPUNCTURE: CPT | Performed by: NURSE PRACTITIONER

## 2025-06-13 PROCEDURE — 84443 ASSAY THYROID STIM HORMONE: CPT | Performed by: NURSE PRACTITIONER

## 2025-06-13 PROCEDURE — 99222 1ST HOSP IP/OBS MODERATE 55: CPT | Mod: ,,, | Performed by: PSYCHIATRY & NEUROLOGY

## 2025-06-13 RX ORDER — ENOXAPARIN SODIUM 100 MG/ML
40 INJECTION SUBCUTANEOUS EVERY 24 HOURS
Status: DISCONTINUED | OUTPATIENT
Start: 2025-06-13 | End: 2025-06-14 | Stop reason: HOSPADM

## 2025-06-13 RX ORDER — BISACODYL 10 MG/1
10 SUPPOSITORY RECTAL DAILY PRN
Status: DISCONTINUED | OUTPATIENT
Start: 2025-06-13 | End: 2025-06-14 | Stop reason: HOSPADM

## 2025-06-13 RX ORDER — ATORVASTATIN CALCIUM 40 MG/1
40 TABLET, FILM COATED ORAL DAILY
Status: DISCONTINUED | OUTPATIENT
Start: 2025-06-13 | End: 2025-06-14 | Stop reason: HOSPADM

## 2025-06-13 RX ADMIN — PANTOPRAZOLE SODIUM 40 MG: 40 TABLET, DELAYED RELEASE ORAL at 08:06

## 2025-06-13 RX ADMIN — METOPROLOL TARTRATE 25 MG: 25 TABLET, FILM COATED ORAL at 09:06

## 2025-06-13 RX ADMIN — ESCITALOPRAM OXALATE 20 MG: 10 TABLET ORAL at 08:06

## 2025-06-13 RX ADMIN — METOPROLOL TARTRATE 25 MG: 25 TABLET, FILM COATED ORAL at 08:06

## 2025-06-13 RX ADMIN — DOXYCYCLINE HYCLATE 100 MG: 100 TABLET, COATED ORAL at 08:06

## 2025-06-13 RX ADMIN — LISINOPRIL 20 MG: 20 TABLET ORAL at 05:06

## 2025-06-13 RX ADMIN — ASPIRIN 81 MG: 81 TABLET, DELAYED RELEASE ORAL at 08:06

## 2025-06-13 RX ADMIN — ENOXAPARIN SODIUM 40 MG: 40 INJECTION SUBCUTANEOUS at 05:06

## 2025-06-13 RX ADMIN — METRONIDAZOLE 500 MG: 500 TABLET ORAL at 09:06

## 2025-06-13 RX ADMIN — DOXYCYCLINE HYCLATE 100 MG: 100 TABLET, COATED ORAL at 09:06

## 2025-06-13 RX ADMIN — QUETIAPINE FUMARATE 200 MG: 100 TABLET ORAL at 09:06

## 2025-06-13 RX ADMIN — METRONIDAZOLE 500 MG: 500 TABLET ORAL at 02:06

## 2025-06-13 RX ADMIN — METRONIDAZOLE 500 MG: 500 TABLET ORAL at 05:06

## 2025-06-13 RX ADMIN — IOHEXOL 100 ML: 350 INJECTION, SOLUTION INTRAVENOUS at 09:06

## 2025-06-13 NOTE — ASSESSMENT & PLAN NOTE
- presented with Dizziness, lightheadedness, blurred vision, disorientation lasting 10-15 minutes on 25  - Stroke RF: HTN, HLD  - Intervention: none resolved symptoms  - Etiology: TBD    Stroke workup:  -CTh: Negative for acute intracranial abnormality   -CTA h/n: Minimal calcification in the bilateral cavernous carotids without flow limiting stenosis.  Otherwise unremarkable arteries of the head and neck.  -MRI brain: Impression:  1.  No acute intracranial findings identified.  2.  Chronic microangiopathic ischemia and atrophy.  -ECHO with BS (25) EF: 60-65%. Negative bubble study.   -CUS: right internal carotid artery is patent with less than 50% stenosis.  The left internal carotid artery is patent with no evidence of stenosis  -LDL:  43  -A1c: 5.7%  -TSH: 3.122  -home medications include: ASA 81 mg. Lipitor 20 mg   NIH Stroke Scale      1a  Level of consciousness: 0=alert; keenly responsive   1b. LOC questions:  0=Answers both tasks correctly   1c. LOC commands: 0=Answers both tasks correctly   2.  Best Gaze: 0=normal   3.  Visual: 0=No visual loss   4. Facial Palsy: 0=Normal symmetric movement   5a.  Motor left arm: 0=No drift, limb holds 90 (or 45) degrees for full 10 seconds   5b.  Motor right arm: 0=No drift, limb holds 90 (or 45) degrees for full 10 seconds   6a. motor left le=No drift, limb holds 90 (or 45) degrees for full 10 seconds   6b  Motor right le=No drift, limb holds 90 (or 45) degrees for full 10 seconds   7. Limb Ataxia: 0=Absent   8.  Sensory: 0=Normal; no sensory loss   9. Best Language:  0=No aphasia, normal   10. Dysarthria: 0=Normal   11. Extinction and Inattention: 0=No abnormality   12. Distal motor function: 0=Normal    Total:   0     Assessment:   - No acute intracranial findings.  - No LVO or significant stenosis   - back at baseline, no reoccurrence  - no neuro deficits on exam      Plan:  -continue Aspirin 81mg daily  -continue Atorvastatin 40mg daily  -PT/OT/ST to  evaluate  -SCD and Lovenox for DVT prophylaxis   - ok to normalize blood pressure  - Neuro checks q4hr ... stat CTh if any neuro change     -follow up out patient with primary care provider.     -remainder of care with primary team     -stroke neurology signing off, let us know if other questions or concerns

## 2025-06-13 NOTE — PROGRESS NOTES
"[facility] Hospital Medicine Progress Note        Chief Complaint: Inpatient Follow-up     HPI:    "70 y.o. female with a history that includes HTN, presented to ED with dizziness.  She notes she was at the grocery store and was looking down at the meats in the deli when  vision became blurry and she felt lightheaded and disoriented.  She reports the symptoms lasted 10-15 minutes.  For the most part it has resolved but she states that her head still feels weird.    Evaluation in ED on grossly normal vital signs except for accelerated blood pressures up to 188/98.  Blood counts and chemistries are unremarkable, troponin was undetectable.  Glucose was noted to be 210, without prior diagnosis of diabetes, UA was negative for glucose.  Chest x-ray and CT of the head both without acute abnormalities."     Interval Hx:   No acute events reported overnight.  Patient was seen at bedside this morning, no family member present during my interview. she reports doing okay denied any CP SOB dizziness, discussed patient's antihypertensive regimen at length, reviewed all the pill bottles with the patient, discussed plan of care     Hemodynamically stable, blood pressure in normotensive range     Case was discussed with patient's nurse     Objective/physical exam:  General: In no acute distress, afebrile  Chest: Clear to auscultation bilaterally  Heart: RRR, +S1, S2  Abdomen: Soft, nontender  MSK: Warm, no lower extremity edema  Neurologic: Alert and oriented , grossly nonfocal  VITAL SIGNS: 24 HRS MIN & MAX LAST   Temp  Min: 97.4 °F (36.3 °C)  Max: 98.5 °F (36.9 °C) 97.5 °F (36.4 °C)   BP  Min: 98/62  Max: 150/88 104/70   Pulse  Min: 68  Max: 101  81   Resp  Min: 17  Max: 18 17   SpO2  Min: 92 %  Max: 96 % (!) 92 %     I have reviewed the following labs:  Recent Labs   Lab 06/11/25  1427   WBC 6.96   RBC 4.83   HGB 15.4   HCT 46.9   MCV 97.1*   MCH 31.9*   MCHC 32.8*   RDW 12.9      MPV 9.7 "     Recent Labs   Lab 06/11/25  1427 06/12/25  0430    142   K 4.3 3.9    109*   CO2 27 26   BUN 12.2 13.2   CREATININE 0.84 0.79   * 113   CALCIUM 9.1 9.2   MG 1.90  --    ALBUMIN 3.9  --    PROT 7.2  --    ALKPHOS 65  --    ALT 60*  --    AST 46*  --    BILITOT 0.5  --      Microbiology Results (last 7 days)       ** No results found for the last 168 hours. **             See below for Radiology    Assessment/Plan:  Generalized Weakness, confusion ,dizziness,r/o acute CVA  HTN Urgency-improved  h/o spinal stenosis, s/p multiple surgeries    Monitor on tele  Neurology team was consulted in ER, follow recommendations  MRI pending,Follow echo  Continue ASA statin  Continue to monitor BP, titrate antihypertensives, DC hydrochlorothiazide,Orthostatics negative  Continue home medications ppi, doxycycline, Flagyl for active treatment of H pylori  Supportive care   Therapy  Otherwise continue current care and monitoring    VTE prophylaxis:  Lovenox      Anticipated discharge and Disposition:  TBD          Portions of this note dictated using EMR integrated voice recognition software, and may be subject to voice recognition errors not corrected at proofreading. Please contact writer for clarification if needed.   _____________________________________________________________________    Malnutrition Status:  Nutrition consulted. Most recent weight and BMI monitored-     Measurements:  Wt Readings from Last 1 Encounters:   06/13/25 67.5 kg (148 lb 14.4 oz)   Body mass index is 29.08 kg/m².    Patient has been screened and assessed by RD.    Malnutrition Type:  Context:    Level:      Malnutrition Characteristic Summary:       Interventions/Recommendations (treatment strategy):        Scheduled Med:   aspirin  81 mg Oral Daily    atorvastatin  40 mg Oral Daily    doxycycline  100 mg Oral Q12H    enoxparin  40 mg Subcutaneous Daily    EScitalopram oxalate  20 mg Oral Daily    lisinopriL  20 mg Oral Daily     metoprolol tartrate  25 mg Oral BID    metroNIDAZOLE  500 mg Oral Q8H    pantoprazole  40 mg Oral Daily    QUEtiapine  200 mg Oral QHS      Continuous Infusions:     PRN Meds:    Current Facility-Administered Medications:     acetaminophen, 650 mg, Oral, Q4H PRN    bisacodyL, 10 mg, Rectal, Daily PRN    dextrose 50%, 12.5 g, Intravenous, PRN    dextrose 50%, 25 g, Intravenous, PRN    glucagon (human recombinant), 1 mg, Intramuscular, PRN    glucose, 16 g, Oral, PRN    glucose, 24 g, Oral, PRN    hydrALAZINE, 10 mg, Intravenous, Q4H PRN    labetalol, 20 mg, Intravenous, Q4H PRN    meclizine, 25 mg, Oral, TID PRN    ondansetron, 4 mg, Intravenous, Q6H PRN    sodium chloride 0.9%, 10 mL, Intravenous, Q12H PRN     Radiology:  I have personally reviewed the following imaging and agree with the radiologist.     Echo Saline Bubble? No    Left Ventricle: The left ventricle is normal in size. Normal wall   thickness. There is normal systolic function with a visually estimated   ejection fraction of 60 - 65%. Grade I diastolic dysfunction.    Right Ventricle: The right ventricle is normal in size Systolic   function is normal.    Aortic Valve: Aortic valve peak velocity is 1.6 m/s. Mean gradient is 5   mmHg.    Mitral Valve: The mitral valve is structurally normal. The mean   pressure gradient across the mitral valve is 3 mmHg at a heart rate of    bpm.    Tricuspid Valve: The tricuspid valve is structurally normal.    Pericardium: There is no pericardial effusion.      Carloz Miramontes MD  Department of Hospital Medicine   [facility]   06/13/2025

## 2025-06-13 NOTE — PT/OT/SLP EVAL
[facility]  Speech Language Pathology Department  Cognitive-Communication Evaluation    Patient Name:  Alisa Maradiaga   MRN:  [id number]    Recommendations     General recommendations:  SLP intervention not indicated  Communication strategies:  go to room if call light pushed    Discharge therapy intensity: No Therapy Indicated  Barriers to safe discharge: acuity of illness    History     Alisa Maradiaga is a/n 70 y.o. female admitted with CVA workup for dizziness and weakness.  Passed Perez swallow screen.    Past Medical History:   Diagnosis Date    Arthritis     Chronic back pain     Depression     Hyperlipidemia     Hypertension     Insomnia     Spinal stenosis, lumbar region, with neurogenic claudication      Past Surgical History:   Procedure Laterality Date    BREAST BIOPSY Bilateral     CAROTID ENDARTERECTOMY Left     CERVICAL FUSION      HYSTERECTOMY      LUMBAR SPINE SURGERY      x3    ROBOTIC FUSION, SPINE, LUMBAR, PLIF N/A 8/2/2023    Procedure: ROBOTIC FUSION,SPINE,LUMBAR,PLIF;  Surgeon: Bailey Prather MD;  Location: [facility];  Service: Neurosurgery;  Laterality: N/A;  OPEN L3-L5 FUSION / LAMINECTOMY // DRILL // SCOPE // ROBOT  //  PRONE // PRONE DULCE //  DIRECT SPINE // NDM    TUBAL LIGATION         Previous level of Function  Education:some college  Lives: with spouse  Handed: Right  Glasses: no  Hearing Aids: no  Home Responsibilities: independent    Imaging   No results found for this or any previous visit.    Subjective     Patient awake, alert, calm, and cooperative.  Patient goals: none stated   Spiritual/Cultural/Mandaeism Beliefs/Practices that affect care: no    Pain/Comfort: Pain Rating 1: 0/10    Respiratory Status:  room air    Objective     ORAL MUSCULATURE  Dentition: own teeth  Facial Movement: WFL  Buccal Strength & Mobility: WFL  Mandibular Strength & Mobility: WFL  Oral Labial Strength & Mobility: WFL  Lingual Strength & Mobility: WFL    SPEECH  PRODUCTION  Phoneme Production: adequate  Voice Quality: adequate  Voice Production: adequate  Speech Rate: appropriate  Loudness: acceptable  Respiration: WFL for speech  Resonance: adequate  Prosody: adequate  Speech Intelligibility  Known Context: Greater that 90%  Unknown Context: Greater that 90%    AUDITORY COMPREHENSION  Following Directions:  1-Step: 100%  2-Step: 100%  Yes/No Questions:  Biographical: 100%  Environmental: 100%  Simple: 100%  Complex: 100%    VERBAL EXPRESSION  Automatic Speech:  Days of the week: Within Functional Limits  Counting: Within Functional Limits  Confrontation Naming  Objects: Within Functional Limits  Wh- Questions:  Object name: 100%  Object function: 100%    COGNITION  Orientation:  Person: yes  Place: yes  Time: yes  Situation: yes   Attention:  Focused: Within Functional Limits  Sustained: Within Functional Limits  Pragmatics:  Eye contact: Within Functional Limits  Facial expression: Within Functional Limits  Memory:  Immediate: Within Functional Limits  Short Term: Within Functional Limits (9/12 on Four Word Memory Task)  Long Term: Within Functional Limits  Problem Solving  Functional simple: Within Functional Limits  Functional complex: Within Functional Limits  Money Management: Within Functional Limits  Organization:  Convergent thinking: Within Functional Limits  Divergent thinking: Within Functional Limits  Executive Function:  Attention to detail: Within Functional Limits  Awareness: Within Functional Limits  Cognitive endurance: Within Functional Limits  Information processing: Within Functional Limits    Assessment     The patient presents with speech, language, and cognitive abilities WFL.  Skilled SLP services not warranted, please reconsult as needed.    Goals     Multidisciplinary Problems       SLP Goals       Not on file                  Patient Education     Patient provided with verbal education regarding results/recommendations.  Understanding was  verbalized.    Plan     SLP Follow-Up:  No   Patient to be seen:      Plan of Care expires:     Plan of Care reviewed with:  patient      Time Tracking     SLP Treatment Date:   06/13/25  Speech Start Time:  1215  Speech Stop Time:  1225     Speech Total Time (min):  10 min    Billable minutes:  Evaluation of Speech Sound Production with Comprehension and Expression, 10 minutes     06/13/2025

## 2025-06-13 NOTE — CONSULTS
Re stroke w/u    Hpi  2 days ago ha ddistorted vision/dizziness  Sx lasted several minutes  Have since resolved  Denies lateralizing weakness  No p rior episodes  No thinners at home  Nonsmoker  Only med change is recent doxycycline    Scheduled Meds:   aspirin  81 mg Oral Daily    atorvastatin  40 mg Oral Daily    doxycycline  100 mg Oral Q12H    enoxparin  40 mg Subcutaneous Daily    EScitalopram oxalate  20 mg Oral Daily    lisinopriL  20 mg Oral Daily    metoprolol tartrate  25 mg Oral BID    metroNIDAZOLE  500 mg Oral Q8H    pantoprazole  40 mg Oral Daily    QUEtiapine  200 mg Oral QHS     Continuous Infusions:  PRN Meds:.  Current Facility-Administered Medications:     acetaminophen, 650 mg, Oral, Q4H PRN    bisacodyL, 10 mg, Rectal, Daily PRN    dextrose 50%, 12.5 g, Intravenous, PRN    dextrose 50%, 25 g, Intravenous, PRN    glucagon (human recombinant), 1 mg, Intramuscular, PRN    glucose, 16 g, Oral, PRN    glucose, 24 g, Oral, PRN    hydrALAZINE, 10 mg, Intravenous, Q4H PRN    labetalol, 20 mg, Intravenous, Q4H PRN    meclizine, 25 mg, Oral, TID PRN    ondansetron, 4 mg, Intravenous, Q6H PRN    sodium chloride 0.9%, 10 mL, Intravenous, Q12H PRN  Past Medical History:   Diagnosis Date    Arthritis     Chronic back pain     Depression     Hyperlipidemia     Hypertension     Insomnia     Spinal stenosis, lumbar region, with neurogenic claudication      Social History[1]  Past Surgical History:   Procedure Laterality Date    BREAST BIOPSY Bilateral     CAROTID ENDARTERECTOMY Left     CERVICAL FUSION      HYSTERECTOMY      LUMBAR SPINE SURGERY      x3    ROBOTIC FUSION, SPINE, LUMBAR, PLIF N/A 8/2/2023    Procedure: ROBOTIC FUSION,SPINE,LUMBAR,PLIF;  Surgeon: Bailey Prather MD;  Location: [facility];  Service: Neurosurgery;  Laterality: N/A;  OPEN L3-L5 FUSION / LAMINECTOMY // DRILL // SCOPE // ROBOT  //  PRONE // PRONE DULCE //  DIRECT SPINE // NDM    TUBAL LIGATION       Vitals:    06/13/25 1111   BP: 118/80    Pulse: 73   Resp: 19   Temp: 98.1 °F (36.7 °C)     Mental Status: Alert and oriented x3. Language is fluent with good comprehension.    Cranial Nerve: Pupils are equal, round, and reactive to light. Visual fields are intact to confrontation. Normal fundi. Ocular movements are intact. Face is symmetric at rest and with activation with intact sensation throughout. Hearing intact to finger rub bilaterally. Muscles of tongue and palate activate symmetrically. No dysarthria. Strength is full in sternocleidomastoid and trapezius bilaterally.    Motor: Muscle bulk and tone are normal. Strength is 5/5 in all four extremities both proximally and distally. Intact fine motor movements bilaterally. There is no pronator drift or satelliting on arm roll.    Sensory: Sensation is intact to light touch, pinprick, vibration, and proprioception throughout. Romberg is negative.    Reflexes: 2+ and symmetric at the biceps, triceps, brachioradialis, patella, and Achilles bilaterally. Plantar response is flexor bilaterally.    Coordination: No dysmetria on finger-nose-finger or heel-knee-shin. Normal rapid alternating movements. Fast finger tapping with normal amplitude and speed.    Gait: Narrow based with normal stride length and good arm swing bilaterally. Able to walk on heels, toes, and in tandem.    Ct h negative    Awaiting mri    Imp  Probably not stroke  Will clarify with imaging       [1]   Social History  Socioeconomic History    Marital status:    Tobacco Use    Smoking status: Never    Smokeless tobacco: Never   Substance and Sexual Activity    Alcohol use: Yes     Comment: socially    Drug use: Never     Social Drivers of Health     Financial Resource Strain: Low Risk  (6/12/2025)    Overall Financial Resource Strain (CARDIA)     Difficulty of Paying Living Expenses: Not hard at all   Food Insecurity: No Food Insecurity (6/12/2025)    Hunger Vital Sign     Worried About Running Out of Food in the Last Year: Never  true     Ran Out of Food in the Last Year: Never true   Transportation Needs: No Transportation Needs (6/12/2025)    PRAPARE - Transportation     Lack of Transportation (Medical): No     Lack of Transportation (Non-Medical): No   Physical Activity: Inactive (6/12/2025)    Exercise Vital Sign     Days of Exercise per Week: 0 days     Minutes of Exercise per Session: 0 min   Stress: No Stress Concern Present (6/12/2025)    [employer] [place] [employer] - Occupational Stress Questionnaire     Feeling of Stress : Not at all   Housing Stability: Low Risk  (6/12/2025)    Housing Stability Vital Sign     Unable to Pay for Housing in the Last Year: No     Homeless in the Last Year: No

## 2025-06-13 NOTE — SUBJECTIVE & OBJECTIVE
Past Medical History:   Diagnosis Date    Arthritis     Chronic back pain     Depression     Hyperlipidemia     Hypertension     Insomnia     Spinal stenosis, lumbar region, with neurogenic claudication        Past Surgical History:   Procedure Laterality Date    BREAST BIOPSY Bilateral     CAROTID ENDARTERECTOMY Left     CERVICAL FUSION      HYSTERECTOMY      LUMBAR SPINE SURGERY      x3    ROBOTIC FUSION, SPINE, LUMBAR, PLIF N/A 8/2/2023    Procedure: ROBOTIC FUSION,SPINE,LUMBAR,PLIF;  Surgeon: Bailey Prather MD;  Location: [facility];  Service: Neurosurgery;  Laterality: N/A;  OPEN L3-L5 FUSION / LAMINECTOMY // DRILL // SCOPE // ROBOT  //  PRONE // PRONE DULCE //  DIRECT SPINE // NDM    TUBAL LIGATION         Review of patient's allergies indicates:  No Known Allergies      No current facility-administered medications on file prior to encounter.     Current Outpatient Medications on File Prior to Encounter   Medication Sig    atorvastatin (LIPITOR) 20 MG tablet TAKE 1 TABLET BY MOUTH ONCE DAILY ON SATURDAY AND SUNDAY    cholecalciferol, vitamin D3, (VITAMIN D3) 50 mcg (2,000 unit) Tab 1 tablet once daily.    doxycycline (DORYX) 100 MG EC tablet Take 100 mg by mouth 2 (two) times daily.    EScitalopram oxalate (LEXAPRO) 20 MG tablet Take 20 mg by mouth once daily.    hydroCHLOROthiazide (HYDRODIURIL) 12.5 MG Tab Take 12.5 mg by mouth Daily.    lisinopriL (PRINIVIL,ZESTRIL) 20 MG tablet Take 20 mg by mouth Daily.    magnesium oxide (MAG-OX) 400 mg (241.3 mg magnesium) tablet Take 400 mg by mouth once daily. Take 1 tablet by mouth once daily    metoprolol tartrate (LOPRESSOR) 25 MG tablet Take 25 mg by mouth 2 (two) times daily.    metroNIDAZOLE (FLAGYL) 500 MG tablet Take 500 mg by mouth 3 (three) times daily. Take 1 tablet by mouth three times daily for 14 days    pantoprazole (PROTONIX) 40 MG tablet Take 40 mg by mouth once daily. Take 1 tablet po every morning 30 mins before breakfast    QUEtiapine (SEROQUEL) 200  MG Tab Take 200 mg by mouth every evening.     Family History    None       Tobacco Use    Smoking status: Never    Smokeless tobacco: Never   Substance and Sexual Activity    Alcohol use: Yes     Comment: socially    Drug use: Never    Sexual activity: Not on file     Review of Systems   Neurological:  Negative for dizziness, tremors, seizures, syncope, facial asymmetry, speech difficulty, weakness, light-headedness, numbness and headaches.        Objective:     Vital Signs (Most Recent):  Temp: 97.5 °F (36.4 °C) (06/13/25 0810)  Pulse: 81 (06/13/25 0810)  Resp: 17 (06/13/25 0810)  BP: 104/70 (06/13/25 0810)  SpO2: (!) 92 % (06/13/25 0810) Vital Signs (24h Range):  Temp:  [97.4 °F (36.3 °C)-98.5 °F (36.9 °C)] 97.5 °F (36.4 °C)  Pulse:  [] 81  Resp:  [17-18] 17  SpO2:  [92 %-96 %] 92 %  BP: ()/(62-91) 104/70     Weight: 66.9 kg (147 lb 7.8 oz)  Body mass index is 28.8 kg/m².     Physical Exam  Vitals and nursing note reviewed.   Constitutional:       Appearance: Normal appearance.   HENT:      Head: Normocephalic and atraumatic.      Nose: Nose normal.      Mouth/Throat:      Mouth: Mucous membranes are moist.      Pharynx: Oropharynx is clear.   Eyes:      General: No visual field deficit.     Conjunctiva/sclera: Conjunctivae normal.      Pupils: Pupils are equal, round, and reactive to light.   Cardiovascular:      Rate and Rhythm: Normal rate.   Pulmonary:      Effort: Pulmonary effort is normal.   Abdominal:      Palpations: Abdomen is soft.   Skin:     General: Skin is warm and dry.   Neurological:      General: No focal deficit present.      Mental Status: She is alert and oriented to person, place, and time. Mental status is at baseline.      Cranial Nerves: No cranial nerve deficit, dysarthria or facial asymmetry.      Sensory: No sensory deficit.      Motor: Motor function is intact. No weakness.      Coordination: Coordination is intact. Coordination normal.   Psychiatric:         Mood and  Affect: Mood normal.         Behavior: Behavior normal.         Thought Content: Thought content normal.         Judgment: Judgment normal.          NEUROLOGICAL EXAMINATION:     MENTAL STATUS   Oriented to person, place, and time.     CRANIAL NERVES     CN III, IV, VI   Pupils are equal, round, and reactive to light.       Significant Labs: All pertinent lab results from the past 24 hours have been reviewed.    Significant Imaging: I have reviewed all pertinent imaging results/findings within the past 24 hours.

## 2025-06-13 NOTE — PT/OT/SLP PROGRESS
Consult received and chart reviewed, Per PT pt is independent with mobility and per nursing is ambulating to the BR independently, no acute OT needs reported/observed, OT will sign off at this time.

## 2025-06-13 NOTE — PT/OT/SLP PROGRESS
Physical Therapy      Patient Name:  Alisa Maradiaga   MRN:  [id number]    PT eval orders received. PT screen completed. Patient demonstrated independence with bed mobility, donning of shoes, sit<>stand, and gait >120ft in hallway. Pt with no observed strength or sensory deficits. Patient denies need for further acute PT services, PT in agreement. PT to sign off as patient has returned to baseline, please re-consult should patient status change.

## 2025-06-13 NOTE — HPI
Alisa Maradiaga is a 70 y.o. female HTN, HLD, GERD, spinal stenosis lumbar region with neurogenic claudication.  Reported to ED on 6/11/25 with complaint of dizziness, lightheadedness, disoriented and blurred vision while shopping.  Symptoms lasted 10-15 minutes then resolved except for her head still felt weird presentation.  CT head no acute findings.  Patient reported she is taking aspirin 81 mg at home.  She reported she previously was monitored with a heart monitor by a cardiologist and no events were noted.  Stroke neurology consulted for evaluation for TIA/stroke.

## 2025-06-14 VITALS
HEIGHT: 60 IN | HEART RATE: 73 BPM | SYSTOLIC BLOOD PRESSURE: 116 MMHG | TEMPERATURE: 98 F | BODY MASS INDEX: 29.23 KG/M2 | WEIGHT: 148.88 LBS | OXYGEN SATURATION: 89 % | DIASTOLIC BLOOD PRESSURE: 69 MMHG | RESPIRATION RATE: 19 BRPM

## 2025-06-14 LAB
ALBUMIN SERPL-MCNC: 3.4 G/DL (ref 3.4–4.8)
ALBUMIN/GLOB SERPL: 1.1 RATIO (ref 1.1–2)
ALP SERPL-CCNC: 57 UNIT/L (ref 40–150)
ALT SERPL-CCNC: 46 UNIT/L (ref 0–55)
ANION GAP SERPL CALC-SCNC: 7 MEQ/L
AST SERPL-CCNC: 30 UNIT/L (ref 11–45)
BASOPHILS # BLD AUTO: 0.07 X10(3)/MCL
BASOPHILS NFR BLD AUTO: 1 %
BILIRUB SERPL-MCNC: 0.4 MG/DL
BUN SERPL-MCNC: 25.9 MG/DL (ref 9.8–20.1)
CALCIUM SERPL-MCNC: 8.9 MG/DL (ref 8.4–10.2)
CHLORIDE SERPL-SCNC: 106 MMOL/L (ref 98–107)
CO2 SERPL-SCNC: 26 MMOL/L (ref 23–31)
CREAT SERPL-MCNC: 0.86 MG/DL (ref 0.55–1.02)
CREAT/UREA NIT SERPL: 30
EOSINOPHIL # BLD AUTO: 0.17 X10(3)/MCL (ref 0–0.9)
EOSINOPHIL NFR BLD AUTO: 2.5 %
ERYTHROCYTE [DISTWIDTH] IN BLOOD BY AUTOMATED COUNT: 13 % (ref 11.5–17)
GFR SERPLBLD CREATININE-BSD FMLA CKD-EPI: >60 ML/MIN/1.73/M2
GLOBULIN SER-MCNC: 3.1 GM/DL (ref 2.4–3.5)
GLUCOSE SERPL-MCNC: 111 MG/DL (ref 82–115)
HCT VFR BLD AUTO: 43.7 % (ref 37–47)
HGB BLD-MCNC: 14.5 G/DL (ref 12–16)
IMM GRANULOCYTES # BLD AUTO: 0.02 X10(3)/MCL (ref 0–0.04)
IMM GRANULOCYTES NFR BLD AUTO: 0.3 %
LYMPHOCYTES # BLD AUTO: 2.32 X10(3)/MCL (ref 0.6–4.6)
LYMPHOCYTES NFR BLD AUTO: 34.5 %
MCH RBC QN AUTO: 32.1 PG (ref 27–31)
MCHC RBC AUTO-ENTMCNC: 33.2 G/DL (ref 33–36)
MCV RBC AUTO: 96.7 FL (ref 80–94)
MONOCYTES # BLD AUTO: 0.82 X10(3)/MCL (ref 0.1–1.3)
MONOCYTES NFR BLD AUTO: 12.2 %
NEUTROPHILS # BLD AUTO: 3.33 X10(3)/MCL (ref 2.1–9.2)
NEUTROPHILS NFR BLD AUTO: 49.5 %
NRBC BLD AUTO-RTO: 0 %
PLATELET # BLD AUTO: 231 X10(3)/MCL (ref 130–400)
PMV BLD AUTO: 9.6 FL (ref 7.4–10.4)
POTASSIUM SERPL-SCNC: 3.8 MMOL/L (ref 3.5–5.1)
PROT SERPL-MCNC: 6.5 GM/DL (ref 5.8–7.6)
RBC # BLD AUTO: 4.52 X10(6)/MCL (ref 4.2–5.4)
SODIUM SERPL-SCNC: 139 MMOL/L (ref 136–145)
WBC # BLD AUTO: 6.73 X10(3)/MCL (ref 4.5–11.5)

## 2025-06-14 PROCEDURE — 25000003 PHARM REV CODE 250: Performed by: INTERNAL MEDICINE

## 2025-06-14 PROCEDURE — 80053 COMPREHEN METABOLIC PANEL: CPT | Performed by: INTERNAL MEDICINE

## 2025-06-14 PROCEDURE — 25000003 PHARM REV CODE 250: Performed by: NURSE PRACTITIONER

## 2025-06-14 PROCEDURE — 99231 SBSQ HOSP IP/OBS SF/LOW 25: CPT | Mod: ,,, | Performed by: PSYCHIATRY & NEUROLOGY

## 2025-06-14 PROCEDURE — 85025 COMPLETE CBC W/AUTO DIFF WBC: CPT | Performed by: INTERNAL MEDICINE

## 2025-06-14 PROCEDURE — 36415 COLL VENOUS BLD VENIPUNCTURE: CPT | Performed by: INTERNAL MEDICINE

## 2025-06-14 RX ORDER — LISINOPRIL 20 MG/1
20 TABLET ORAL DAILY PRN
Start: 2025-06-14

## 2025-06-14 RX ORDER — ATORVASTATIN CALCIUM 40 MG/1
40 TABLET, FILM COATED ORAL DAILY
Qty: 90 TABLET | Refills: 0 | Status: SHIPPED | OUTPATIENT
Start: 2025-06-15 | End: 2026-06-15

## 2025-06-14 RX ORDER — ASPIRIN 81 MG/1
81 TABLET ORAL DAILY
Qty: 90 TABLET | Refills: 0 | Status: SHIPPED | OUTPATIENT
Start: 2025-06-15 | End: 2026-06-15

## 2025-06-14 RX ADMIN — PANTOPRAZOLE SODIUM 40 MG: 40 TABLET, DELAYED RELEASE ORAL at 09:06

## 2025-06-14 RX ADMIN — ATORVASTATIN CALCIUM 40 MG: 40 TABLET, FILM COATED ORAL at 09:06

## 2025-06-14 RX ADMIN — DOXYCYCLINE HYCLATE 100 MG: 100 TABLET, COATED ORAL at 09:06

## 2025-06-14 RX ADMIN — METOPROLOL TARTRATE 25 MG: 25 TABLET, FILM COATED ORAL at 09:06

## 2025-06-14 RX ADMIN — ASPIRIN 81 MG: 81 TABLET, DELAYED RELEASE ORAL at 09:06

## 2025-06-14 RX ADMIN — METRONIDAZOLE 500 MG: 500 TABLET ORAL at 05:06

## 2025-06-14 RX ADMIN — ESCITALOPRAM OXALATE 20 MG: 10 TABLET ORAL at 09:06

## 2025-06-14 NOTE — PROGRESS NOTES
[facility] - 9th Floor Medical Telemetry  Neurology  Progress Note    Patient Name: Alisa Maradiaga  MRN: [id number]  Admission Date: 6/11/2025  Hospital Length of Stay: 3 days  Code Status: Full Code   Attending Provider: Carloz Miramontes MD  Primary Care Physician: Dave Mcleod MD   Principal Problem:Episode of dizziness    HPI:   Alisa Maradiaga is a 70 y.o. female HTN, HLD, GERD, spinal stenosis lumbar region with neurogenic claudication.  Reported to ED on 6/11/25 with complaint of dizziness, lightheadedness, disoriented and blurred vision while shopping.  Symptoms lasted 10-15 minutes then resolved except for her head still felt weird presentation.  CT head no acute findings.  Patient reported she is taking aspirin 81 mg at home.  She reported she previously was monitored with a heart monitor by a cardiologist and no events were noted.  Stroke neurology consulted for evaluation for TIA/stroke.       Overview/Hospital Course:  No notes on file      Past Medical History:   Diagnosis Date    Arthritis     Chronic back pain     Depression     Hyperlipidemia     Hypertension     Insomnia     Spinal stenosis, lumbar region, with neurogenic claudication        Past Surgical History:   Procedure Laterality Date    BREAST BIOPSY Bilateral     CAROTID ENDARTERECTOMY Left     CERVICAL FUSION      HYSTERECTOMY      LUMBAR SPINE SURGERY      x3    ROBOTIC FUSION, SPINE, LUMBAR, PLIF N/A 8/2/2023    Procedure: ROBOTIC FUSION,SPINE,LUMBAR,PLIF;  Surgeon: Bailey Prather MD;  Location: [facility];  Service: Neurosurgery;  Laterality: N/A;  OPEN L3-L5 FUSION / LAMINECTOMY // DRILL // SCOPE // ROBOT  //  PRONE // PRONE DULCE //  DIRECT SPINE // NDM    TUBAL LIGATION         Review of patient's allergies indicates:  No Known Allergies      No current facility-administered medications on file prior to encounter.     Current Outpatient Medications on File Prior to Encounter   Medication Sig    atorvastatin  (LIPITOR) 20 MG tablet TAKE 1 TABLET BY MOUTH ONCE DAILY ON SATURDAY AND SUNDAY    cholecalciferol, vitamin D3, (VITAMIN D3) 50 mcg (2,000 unit) Tab 1 tablet once daily.    doxycycline (DORYX) 100 MG EC tablet Take 100 mg by mouth 2 (two) times daily.    EScitalopram oxalate (LEXAPRO) 20 MG tablet Take 20 mg by mouth once daily.    hydroCHLOROthiazide (HYDRODIURIL) 12.5 MG Tab Take 12.5 mg by mouth Daily.    lisinopriL (PRINIVIL,ZESTRIL) 20 MG tablet Take 20 mg by mouth Daily.    magnesium oxide (MAG-OX) 400 mg (241.3 mg magnesium) tablet Take 400 mg by mouth once daily. Take 1 tablet by mouth once daily    metoprolol tartrate (LOPRESSOR) 25 MG tablet Take 25 mg by mouth 2 (two) times daily.    metroNIDAZOLE (FLAGYL) 500 MG tablet Take 500 mg by mouth 3 (three) times daily. Take 1 tablet by mouth three times daily for 14 days    pantoprazole (PROTONIX) 40 MG tablet Take 40 mg by mouth once daily. Take 1 tablet po every morning 30 mins before breakfast    QUEtiapine (SEROQUEL) 200 MG Tab Take 200 mg by mouth every evening.     Family History    None       Tobacco Use    Smoking status: Never    Smokeless tobacco: Never   Substance and Sexual Activity    Alcohol use: Yes     Comment: socially    Drug use: Never    Sexual activity: Not on file     Review of Systems   Neurological:  Negative for dizziness, tremors, seizures, syncope, facial asymmetry, speech difficulty, weakness, light-headedness, numbness and headaches.        Objective:     Vital Signs (Most Recent):  Temp: 97.5 °F (36.4 °C) (06/13/25 0810)  Pulse: 81 (06/13/25 0810)  Resp: 17 (06/13/25 0810)  BP: 104/70 (06/13/25 0810)  SpO2: (!) 92 % (06/13/25 0810) Vital Signs (24h Range):  Temp:  [97.4 °F (36.3 °C)-98.5 °F (36.9 °C)] 97.5 °F (36.4 °C)  Pulse:  [] 81  Resp:  [17-18] 17  SpO2:  [92 %-96 %] 92 %  BP: ()/(62-91) 104/70     Weight: 66.9 kg (147 lb 7.8 oz)  Body mass index is 28.8 kg/m².     Physical Exam  Vitals and nursing note reviewed.    Constitutional:       Appearance: Normal appearance.   HENT:      Head: Normocephalic and atraumatic.      Nose: Nose normal.      Mouth/Throat:      Mouth: Mucous membranes are moist.      Pharynx: Oropharynx is clear.   Eyes:      General: No visual field deficit.     Conjunctiva/sclera: Conjunctivae normal.      Pupils: Pupils are equal, round, and reactive to light.   Cardiovascular:      Rate and Rhythm: Normal rate.   Pulmonary:      Effort: Pulmonary effort is normal.   Abdominal:      Palpations: Abdomen is soft.   Skin:     General: Skin is warm and dry.   Neurological:      General: No focal deficit present.      Mental Status: She is alert and oriented to person, place, and time. Mental status is at baseline.      Cranial Nerves: No cranial nerve deficit, dysarthria or facial asymmetry.      Sensory: No sensory deficit.      Motor: Motor function is intact. No weakness.      Coordination: Coordination is intact. Coordination normal.   Psychiatric:         Mood and Affect: Mood normal.         Behavior: Behavior normal.         Thought Content: Thought content normal.         Judgment: Judgment normal.          NEUROLOGICAL EXAMINATION:     MENTAL STATUS   Oriented to person, place, and time.     CRANIAL NERVES     CN III, IV, VI   Pupils are equal, round, and reactive to light.       Significant Labs: All pertinent lab results from the past 24 hours have been reviewed.    Significant Imaging: I have reviewed all pertinent imaging results/findings within the past 24 hours.  Assessment and Plan:     * Episode of dizziness  - presented with Dizziness, lightheadedness, blurred vision, disorientation lasting 10-15 minutes on 6/11/25  - Stroke RF: HTN, HLD  - Intervention: none resolved symptoms  - Etiology: TBD    Stroke workup:  -CTh: Negative for acute intracranial abnormality   -CTA h/n: Minimal calcification in the bilateral cavernous carotids without flow limiting stenosis.  Otherwise unremarkable  arteries of the head and neck.  -MRI brain: Impression:  1.  No acute intracranial findings identified.  2.  Chronic microangiopathic ischemia and atrophy.  -ECHO with BS (25) EF: 60-65%. Negative bubble study.   -CUS: right internal carotid artery is patent with less than 50% stenosis.  The left internal carotid artery is patent with no evidence of stenosis  -LDL:  43  -A1c: 5.7%  -TSH: 3.122  -home medications include: ASA 81 mg. Lipitor 20 mg   NIH Stroke Scale      1a  Level of consciousness: 0=alert; keenly responsive   1b. LOC questions:  0=Answers both tasks correctly   1c. LOC commands: 0=Answers both tasks correctly   2.  Best Gaze: 0=normal   3.  Visual: 0=No visual loss   4. Facial Palsy: 0=Normal symmetric movement   5a.  Motor left arm: 0=No drift, limb holds 90 (or 45) degrees for full 10 seconds   5b.  Motor right arm: 0=No drift, limb holds 90 (or 45) degrees for full 10 seconds   6a. motor left le=No drift, limb holds 90 (or 45) degrees for full 10 seconds   6b  Motor right le=No drift, limb holds 90 (or 45) degrees for full 10 seconds   7. Limb Ataxia: 0=Absent   8.  Sensory: 0=Normal; no sensory loss   9. Best Language:  0=No aphasia, normal   10. Dysarthria: 0=Normal   11. Extinction and Inattention: 0=No abnormality   12. Distal motor function: 0=Normal    Total:   0     Assessment:   - No acute intracranial findings.  - No LVO or significant stenosis   - back at baseline, no reoccurrence  - no neuro deficits on exam      Plan:  -continue Aspirin 81mg daily  -continue Atorvastatin 40mg daily  -PT/OT/ST to evaluate  -SCD and Lovenox for DVT prophylaxis   - ok to normalize blood pressure  - Neuro checks q4hr ... stat CTh if any neuro change     -follow up out patient with primary care provider.     -remainder of care with primary team     -stroke neurology signing off, let us know if other questions or concerns           VTE Risk Mitigation (From admission, onward)           Ordered      enoxaparin injection 40 mg  Daily         06/13/25 0843     Place sequential compression device  Until discontinued         06/13/25 0843     IP VTE HIGH RISK PATIENT  Once         06/11/25 1824     Place sequential compression device  Until discontinued         06/11/25 1824                    Mariluz Fajardo NP  Neurology  [facility] - 9th Floor Medical Telemetry    I have seen/examined the patient.  NP was scribe.  I agree with the findings unless outlined below:    Garret Garcia MD  [facility]     Asymptomatic for almost 48 h  Mri is negative  Ok to NH home

## 2025-06-14 NOTE — DISCHARGE SUMMARY
"[facility] Medicine Discharge Summary    Admit Date: 6/11/2025  Discharge Date and Time: 6/14/202511:27 AM  Admitting Physician:  Team  Discharging Physician: Carloz Miramontes MD.  Primary Care Physician: Dave Mcleod MD  Consults: Neurology    Discharge Diagnoses:  Generalized Weakness, confusion ,dizziness-unclear etiology, BP issues vs medications,  ruled out  acute CVA  HTN Urgency-improved  h/o spinal stenosis, s/p multiple surgeries    Hospital Course:   "70 y.o. female with a history that includes HTN, presented to ED with dizziness.  She notes she was at the grocery store and was looking down at the meats in the deli when  vision became blurry and she felt lightheaded and disoriented.  She reports the symptoms lasted 10-15 minutes.  For the most part it has resolved but she states that her head still feels weird.    Evaluation in ED on grossly normal vital signs except for accelerated blood pressures up to 188/98.  Blood counts and chemistries are unremarkable, troponin was undetectable.  Glucose was noted to be 210, without prior diagnosis of diabetes, UA was negative for glucose.  Chest x-ray and CT of the head both without acute abnormalities."      Stroke neurology consulted for ? TIA/stroke.   CTH:No acute intracranial findings.  CTA:No LVO or significant stenosis   MRI-Neg  ECHO with BS (6/11/25) EF: 60-65%. Negative bubble study.   CUS: right internal carotid artery is patent with less than 50% stenosis.  The left internal carotid artery is patent with no evidence of stenosis    Pt  back at baseline, no reoccurrence, no neuro deficits on exam .Neuro team cleared to discharge.      During the hospital stay, BP was monitored, antihypertensives were titrated.      Pt was seen and examined on the day of discharge,pt reported doing well, no complaints, , discussed hospital course, follow ups,recommended logging bp, close f/u with pcp for further " antihypertensive titration,pt deemed stable, discharged to home,pt in agreement with discharge plan.    Vitals:  VITAL SIGNS: 24 HRS MIN & MAX LAST   Temp  Min: 97.9 °F (36.6 °C)  Max: 98.4 °F (36.9 °C) 97.9 °F (36.6 °C)   BP  Min: 80/57  Max: 138/88 116/69   Pulse  Min: 73  Max: 108  73   No data recorded 19   SpO2  Min: 89 %  Max: 94 % (!) 89 %       Physical Exam:  General: In no acute distress, afebrile  Chest: Clear to auscultation bilaterally  Heart: RRR, +S1, S2  Abdomen: Soft, nontender  MSK: Warm, no lower extremity edema  Neurologic: Alert and oriented , grossly nonfocal    Procedures Performed: No admission procedures for hospital encounter.     Significant Diagnostic Studies: See Full reports for all details    Recent Labs   Lab 06/11/25  1427 06/14/25  0613   WBC 6.96 6.73   RBC 4.83 4.52   HGB 15.4 14.5   HCT 46.9 43.7   MCV 97.1* 96.7*   MCH 31.9* 32.1*   MCHC 32.8* 33.2   RDW 12.9 13.0    231   MPV 9.7 9.6       Recent Labs   Lab 06/11/25  1427 06/12/25  0430 06/14/25  0613    142 139   K 4.3 3.9 3.8    109* 106   CO2 27 26 26   BUN 12.2 13.2 25.9*   CREATININE 0.84 0.79 0.86   * 113 111   CALCIUM 9.1 9.2 8.9   MG 1.90  --   --    ALBUMIN 3.9  --  3.4   PROT 7.2  --  6.5   ALKPHOS 65  --  57   ALT 60*  --  46   AST 46*  --  30   BILITOT 0.5  --  0.4        Microbiology Results (last 7 days)       ** No results found for the last 168 hours. **             MRI Brain Without Contrast  Narrative: EXAMINATION:  MRI BRAIN WITHOUT CONTRAST    CLINICAL HISTORY:  Dizziness, non-specific;dizziness with blurred vision evaluate for stroke;    TECHNIQUE:  Multiplanar MRI sequences were performed of the brain without contrast.    COMPARISON:  CT brain without contrast June 11, 2025    FINDINGS:  Chronic microvascular ischemic changes are moderate with periventricular and deep white matter T2 FLAIR hyperintense signals.  Generalized cerebral and cerebellar cortical volume loss.  There is  no diffusion weighted restriction or altered signal ADC map to indicate an acute infarct.  There is no old hemorrhagic by product without gradient echo sequence dephasing artifact.  There is partial emptying of the sella.    The cerebellar tonsils are normally positioned. There is no acute intracranial hemorrhage, hydrocephalus, midline shift or mass effect. No acute extra axial fluid collections identified. The mastoid air cells are clear.  Impression: 1.  No acute intracranial findings identified.    2.  Chronic microangiopathic ischemia and atrophy.    Electronically signed by: Salinas Salas  Date:    06/13/2025  Time:    21:29  Echo Saline Bubble? Yes    Left Atrium: Agitated saline study of the atrial septum is negative,   suggesting absence of intracardiac shunt at the atrial level.    Limited echo done to perform bubble study  CTA Head and Neck (xpd)  Narrative: EXAMINATION:  CTA HEAD AND NECK (XPD)    CLINICAL HISTORY:  Dizziness, non-specific;dizziness with blurred vision;    TECHNIQUE:  Axial images of the head and neck were obtained with IV contrast administration.  Coronal and sagittal reconstructions were provided.  Three dimensional and MIP images were obtained and evaluated.  Total DLP was 2472 mGy-cm. Dose lowering technique and automated exposure control were utilized for this exam.The degree of carotid stenosis was evaluated using NASCET criteria.    COMPARISON:  CT of the head 06/13/2025.    FINDINGS:  Vascular findings:    There are mixed density plaques throughout the aortic arch without flow limiting stenosis.  There is a normal branching pattern of the aortic arch.  The brachiocephalic artery is normal right common carotid artery is normal.  The right cervical ICA is normal.  The left common carotid artery is normal.  The left cervical ICA is normal.  The dominant non dominant left vertebral arteries are normal throughout their cervical courses.  The intradural vertebral arteries are normal.   The basilar artery is normal.  There are minimal calcifications in the cavernous carotids flow limiting stenosis.  The wnayzr-hs-Htnhdu is intact.  There is a right frontal DVA.  The dural venous sinuses are patent.    Nonvascular findings:    There is no hemorrhage, hydrocephalus, or midline shift.  There is no abnormal intracranial enhancement.  The bilateral orbits are normal.  The paranasal sinuses are free of disease.  The airway is patent.  There is anterior cervical fixation hardware in place.  There is no cervical lymphadenopathy.  The visualized lung apices are clear.  Impression: Minimal calcification in the bilateral cavernous carotids without flow limiting stenosis.  Otherwise unremarkable arteries of the head and neck.    Electronically signed by: William Reynolds MD  Date:    06/13/2025  Time:    10:07         Medication List        START taking these medications      aspirin 81 MG EC tablet  Commonly known as: ECOTRIN  Take 1 tablet (81 mg total) by mouth once daily.  Start taking on: Winsome 15, 2025            CHANGE how you take these medications      atorvastatin 40 MG tablet  Commonly known as: LIPITOR  Take 1 tablet (40 mg total) by mouth once daily.  Start taking on: Winsome 15, 2025  What changed:   medication strength  See the new instructions.     lisinopriL 20 MG tablet  Commonly known as: PRINIVIL,ZESTRIL  Take 1 tablet (20 mg total) by mouth daily as needed (If BP >140/80).  What changed:   when to take this  reasons to take this            CONTINUE taking these medications      cholecalciferol (vitamin D3) 50 mcg (2,000 unit) Tab  Commonly known as: VITAMIN D3     doxycycline 100 MG EC tablet  Commonly known as: DORYX     EScitalopram oxalate 20 MG tablet  Commonly known as: LEXAPRO     magnesium oxide 400 mg (241.3 mg magnesium) tablet  Commonly known as: MAG-OX     metoprolol tartrate 25 MG tablet  Commonly known as: LOPRESSOR     metroNIDAZOLE 500 MG tablet  Commonly known as: FLAGYL      pantoprazole 40 MG tablet  Commonly known as: PROTONIX     QUEtiapine 200 MG Tab  Commonly known as: SEROQUEL            STOP taking these medications      hydroCHLOROthiazide 12.5 MG Tab               Where to Get Your Medications        These medications were sent to [facility] [address] [place], [place] [id number]      Phone: [phone]   aspirin 81 MG EC tablet  atorvastatin 40 MG tablet       Information about where to get these medications is not yet available    Ask your nurse or doctor about these medications  lisinopriL 20 MG tablet          Explained in detail to the patient about the discharge plan, medications, and follow-up visits. Pt understands and agrees with the treatment plan  Discharge Disposition: Home or Self Care   Discharged Condition: stable  Diet-   Dietary Orders (From admission, onward)       Start     Ordered    06/11/25 1825  Diet Heart Healthy  (Diet/Nutrition - [facility] Locations)  Diet effective now         06/11/25 1824                   Medications Per [place] med rec  Activities as tolerated   Follow-up Information       Dave Mcleod MD Follow up.    Specialty: Internal Medicine  Contact information:  [address] [facility]  [address]  [place] [address]  [phone]                           For further questions contact hospitalist office    Discharge time 33 minutes    For worsening symptoms, chest pain, shortness of breath, increased abdominal pain, high grade fever, stroke or stroke like symptoms, immediately go to the nearest Emergency Room or call 911 as soon as possible.      Carloz Queen M.D on 6/14/2025. at 11:27 AM.

## 2025-06-14 NOTE — PLAN OF CARE
Problem: Adult Inpatient Plan of Care  Goal: Plan of Care Review  Outcome: Progressing  Goal: Patient-Specific Goal (Individualized)  Outcome: Progressing  Goal: Absence of Hospital-Acquired Illness or Injury  Outcome: Progressing  Goal: Optimal Comfort and Wellbeing  Outcome: Progressing  Goal: Readiness for Transition of Care  Outcome: Progressing     Problem: Infection  Goal: Absence of Infection Signs and Symptoms  Outcome: Progressing     Problem: Stroke, Ischemic (Includes Transient Ischemic Attack)  Goal: Optimal Coping  Outcome: Progressing  Goal: Effective Bowel Elimination  Outcome: Progressing  Goal: Optimal Cerebral Tissue Perfusion  Outcome: Progressing  Goal: Optimal Cognitive Function  Outcome: Progressing  Goal: Improved Communication Skills  Outcome: Progressing  Goal: Optimal Functional Ability  Outcome: Progressing  Goal: Optimal Nutrition Intake  Outcome: Progressing  Goal: Effective Oxygenation and Ventilation  Outcome: Progressing  Goal: Improved Sensorimotor Function  Outcome: Progressing  Goal: Safe and Effective Swallow  Outcome: Progressing  Goal: Effective Urinary Elimination  Outcome: Progressing      No

## 2025-06-16 LAB
LEFT CCA DIST DIAS: 19 CM/S
LEFT CCA DIST SYS: 60 CM/S
LEFT CCA PROX DIAS: 23 CM/S
LEFT CCA PROX SYS: 71 CM/S
LEFT ECA DIAS: 5 CM/S
LEFT ECA SYS: 49 CM/S
LEFT ICA DIST DIAS: 27 CM/S
LEFT ICA DIST SYS: 56 CM/S
LEFT ICA MID DIAS: 9 CM/S
LEFT ICA MID SYS: 44 CM/S
LEFT ICA PROX DIAS: 15 CM/S
LEFT ICA PROX SYS: 57 CM/S
LEFT VERTEBRAL DIAS: 11 CM/S
LEFT VERTEBRAL SYS: 35 CM/S
OHS CV CAROTID RIGHT ICA EDV HIGHEST: 27
OHS CV CAROTID ULTRASOUND LEFT ICA/CCA RATIO: 0.95
OHS CV CAROTID ULTRASOUND RIGHT ICA/CCA RATIO: 0.8
OHS CV PV CAROTID LEFT HIGHEST CCA: 71
OHS CV PV CAROTID LEFT HIGHEST ICA: 57
OHS CV PV CAROTID RIGHT HIGHEST CCA: 87
OHS CV PV CAROTID RIGHT HIGHEST ICA: 70
OHS CV US CAROTID LEFT HIGHEST EDV: 27
RIGHT CCA DIST DIAS: 31 CM/S
RIGHT CCA DIST SYS: 87 CM/S
RIGHT CCA PROX DIAS: 17 CM/S
RIGHT CCA PROX SYS: 74 CM/S
RIGHT ECA DIAS: 5 CM/S
RIGHT ECA SYS: 44 CM/S
RIGHT ICA DIST DIAS: 18 CM/S
RIGHT ICA DIST SYS: 52 CM/S
RIGHT ICA MID DIAS: 17 CM/S
RIGHT ICA MID SYS: 59 CM/S
RIGHT ICA PROX DIAS: 27 CM/S
RIGHT ICA PROX SYS: 70 CM/S
RIGHT VERTEBRAL DIAS: 13 CM/S
RIGHT VERTEBRAL SYS: 56 CM/S

## (undated) DEVICE — SUT BONE WAX 2.5 GRMS 12/BX

## (undated) DEVICE — SHEET DRAPE MEDIUM

## (undated) DEVICE — PILLOW HEAD REST

## (undated) DEVICE — Device

## (undated) DEVICE — PROBE BALL TIP 2.3MM

## (undated) DEVICE — SUT VICRYL 4-0 18 P-3

## (undated) DEVICE — SUT VICRYL PLUS 0 CT-1 18IN

## (undated) DEVICE — DRAPE STERILE Z BACK TABLE

## (undated) DEVICE — DRAPE OPMI STERILE

## (undated) DEVICE — PAD DERMAPROX XL 22X14X.5IN

## (undated) DEVICE — APPLICATOR CHLORAPREP ORN 26ML

## (undated) DEVICE — NDL HYPO 22GX1 1/2 SYR 10ML LL

## (undated) DEVICE — GLOVE PROTEXIS LTX MICRO  7.5

## (undated) DEVICE — DRESSING TELFA N ADH 3X8

## (undated) DEVICE — ELECTRODE BLADE E-Z CLEAN 4IN

## (undated) DEVICE — TIP KERRISON RONGEUR SHARP 4MM

## (undated) DEVICE — SYR IRRIGATION BULB STER 60ML

## (undated) DEVICE — GLOVE PROTEXIS BLUE LATEX 7

## (undated) DEVICE — TUBE SUCTION MEDI-VAC STERILE

## (undated) DEVICE — BLADE SURG STAINLESS STEEL #22

## (undated) DEVICE — RESERVOIR JACKSON-PRATT 100CC

## (undated) DEVICE — GAUZE SPONGE XRAY 4X4

## (undated) DEVICE — SHEET AVIENE MICFIB 1.4X1.4IN

## (undated) DEVICE — INSTRUMENT FRAZIER 10FR W/VENT

## (undated) DEVICE — KIT SURGICAL TURNOVER

## (undated) DEVICE — COVER C-ARM STRAP BAND 44X80IN

## (undated) DEVICE — GLOVE PROTEXIS PI SYN SURG 6.5

## (undated) DEVICE — GLOVE PROTEXIS BLUE LATEX 8

## (undated) DEVICE — CLOSURE SKIN STERI STRIP 1/2X4

## (undated) DEVICE — DRAPE TOP 53X102IN

## (undated) DEVICE — DRAPE C-ARMOR EQUIPMENT COVER

## (undated) DEVICE — KIT MAZOR X SPINE DISPOSABLE

## (undated) DEVICE — COVER HD BACK TABLE 6FT

## (undated) DEVICE — ELECTRODE BLD EXT 6.50 ST DISP

## (undated) DEVICE — TIP KERRISON RONGEUR SHARP 3MM

## (undated) DEVICE — GOWN X-LG STERILE BACK

## (undated) DEVICE — BENZOIN TINCTURE 0.66ML

## (undated) DEVICE — SOL NACL IRR 1000ML BTL

## (undated) DEVICE — SUT VICRYL PLUS 3-0 X-1 18IN

## (undated) DEVICE — KIT SURGIFLO HEMOSTATIC MATRIX

## (undated) DEVICE — DRAIN ROUND SUCTION 10FR

## (undated) DEVICE — DRAPE SURG W/TWL 17 5/8X23

## (undated) DEVICE — DISH PETRI MED 3.5IN

## (undated) DEVICE — BUR BONE CUT MICRO TPS 3X3.8MM

## (undated) DEVICE — KIT POS JACKSON TABLE NO HDRST

## (undated) DEVICE — ELECTRODE PATIENT RETURN DISP

## (undated) DEVICE — DRAPE ORTH SPLIT 77X108IN

## (undated) DEVICE — TRAY CATH FOL SIL URIMTR 16FR